# Patient Record
Sex: FEMALE | Race: WHITE | Employment: FULL TIME | URBAN - METROPOLITAN AREA
[De-identification: names, ages, dates, MRNs, and addresses within clinical notes are randomized per-mention and may not be internally consistent; named-entity substitution may affect disease eponyms.]

---

## 2022-06-10 ENCOUNTER — LAB ENCOUNTER (OUTPATIENT)
Dept: LAB | Age: 27
End: 2022-06-10
Attending: EMERGENCY MEDICINE
Payer: COMMERCIAL

## 2022-06-10 ENCOUNTER — OFFICE VISIT (OUTPATIENT)
Dept: FAMILY MEDICINE CLINIC | Facility: CLINIC | Age: 27
End: 2022-06-10
Payer: COMMERCIAL

## 2022-06-10 VITALS
DIASTOLIC BLOOD PRESSURE: 62 MMHG | HEART RATE: 53 BPM | OXYGEN SATURATION: 100 % | HEIGHT: 68 IN | RESPIRATION RATE: 14 BRPM | WEIGHT: 157 LBS | SYSTOLIC BLOOD PRESSURE: 112 MMHG | BODY MASS INDEX: 23.79 KG/M2

## 2022-06-10 DIAGNOSIS — Z23 NEED FOR TDAP VACCINATION: ICD-10-CM

## 2022-06-10 DIAGNOSIS — K58.2 IRRITABLE BOWEL SYNDROME WITH BOTH CONSTIPATION AND DIARRHEA: ICD-10-CM

## 2022-06-10 DIAGNOSIS — Z00.00 LABORATORY EXAMINATION ORDERED AS PART OF A ROUTINE GENERAL MEDICAL EXAMINATION: ICD-10-CM

## 2022-06-10 DIAGNOSIS — Z00.00 ENCOUNTER FOR ANNUAL PHYSICAL EXAMINATION EXCLUDING GYNECOLOGICAL EXAMINATION IN A PATIENT OLDER THAN 17 YEARS: Primary | ICD-10-CM

## 2022-06-10 DIAGNOSIS — F41.1 GENERALIZED ANXIETY DISORDER: ICD-10-CM

## 2022-06-10 LAB
ALBUMIN SERPL-MCNC: 4.1 G/DL (ref 3.4–5)
ALBUMIN/GLOB SERPL: 1.1 {RATIO} (ref 1–2)
ALP LIVER SERPL-CCNC: 64 U/L
ALT SERPL-CCNC: 24 U/L
ANION GAP SERPL CALC-SCNC: 5 MMOL/L (ref 0–18)
AST SERPL-CCNC: 9 U/L (ref 15–37)
BASOPHILS # BLD AUTO: 0.07 X10(3) UL (ref 0–0.2)
BASOPHILS NFR BLD AUTO: 1.3 %
BILIRUB SERPL-MCNC: 0.5 MG/DL (ref 0.1–2)
BUN BLD-MCNC: 10 MG/DL (ref 7–18)
CALCIUM BLD-MCNC: 9.4 MG/DL (ref 8.5–10.1)
CHLORIDE SERPL-SCNC: 107 MMOL/L (ref 98–112)
CHOLEST SERPL-MCNC: 163 MG/DL (ref ?–200)
CO2 SERPL-SCNC: 27 MMOL/L (ref 21–32)
CREAT BLD-MCNC: 0.77 MG/DL
EOSINOPHIL # BLD AUTO: 0.2 X10(3) UL (ref 0–0.7)
EOSINOPHIL NFR BLD AUTO: 3.8 %
ERYTHROCYTE [DISTWIDTH] IN BLOOD BY AUTOMATED COUNT: 11.7 %
FASTING PATIENT LIPID ANSWER: YES
FASTING STATUS PATIENT QL REPORTED: YES
GLOBULIN PLAS-MCNC: 3.7 G/DL (ref 2.8–4.4)
GLUCOSE BLD-MCNC: 87 MG/DL (ref 70–99)
HCT VFR BLD AUTO: 45 %
HDLC SERPL-MCNC: 88 MG/DL (ref 40–59)
HGB BLD-MCNC: 14.9 G/DL
IMM GRANULOCYTES # BLD AUTO: 0.01 X10(3) UL (ref 0–1)
IMM GRANULOCYTES NFR BLD: 0.2 %
LDLC SERPL CALC-MCNC: 67 MG/DL (ref ?–100)
LYMPHOCYTES # BLD AUTO: 1.94 X10(3) UL (ref 1–4)
LYMPHOCYTES NFR BLD AUTO: 37.2 %
MCH RBC QN AUTO: 30.8 PG (ref 26–34)
MCHC RBC AUTO-ENTMCNC: 33.1 G/DL (ref 31–37)
MCV RBC AUTO: 93.2 FL
MONOCYTES # BLD AUTO: 0.41 X10(3) UL (ref 0.1–1)
MONOCYTES NFR BLD AUTO: 7.9 %
NEUTROPHILS # BLD AUTO: 2.58 X10 (3) UL (ref 1.5–7.7)
NEUTROPHILS # BLD AUTO: 2.58 X10(3) UL (ref 1.5–7.7)
NEUTROPHILS NFR BLD AUTO: 49.6 %
NONHDLC SERPL-MCNC: 75 MG/DL (ref ?–130)
OSMOLALITY SERPL CALC.SUM OF ELEC: 286 MOSM/KG (ref 275–295)
PLATELET # BLD AUTO: 292 10(3)UL (ref 150–450)
POTASSIUM SERPL-SCNC: 4.4 MMOL/L (ref 3.5–5.1)
PROT SERPL-MCNC: 7.8 G/DL (ref 6.4–8.2)
RBC # BLD AUTO: 4.83 X10(6)UL
SODIUM SERPL-SCNC: 139 MMOL/L (ref 136–145)
TRIGL SERPL-MCNC: 32 MG/DL (ref 30–149)
TSI SER-ACNC: 1.18 MIU/ML (ref 0.36–3.74)
VLDLC SERPL CALC-MCNC: 5 MG/DL (ref 0–30)
WBC # BLD AUTO: 5.2 X10(3) UL (ref 4–11)

## 2022-06-10 PROCEDURE — 90471 IMMUNIZATION ADMIN: CPT | Performed by: EMERGENCY MEDICINE

## 2022-06-10 PROCEDURE — 80050 GENERAL HEALTH PANEL: CPT | Performed by: EMERGENCY MEDICINE

## 2022-06-10 PROCEDURE — 3008F BODY MASS INDEX DOCD: CPT | Performed by: EMERGENCY MEDICINE

## 2022-06-10 PROCEDURE — 99385 PREV VISIT NEW AGE 18-39: CPT | Performed by: EMERGENCY MEDICINE

## 2022-06-10 PROCEDURE — 80061 LIPID PANEL: CPT | Performed by: EMERGENCY MEDICINE

## 2022-06-10 PROCEDURE — 3078F DIAST BP <80 MM HG: CPT | Performed by: EMERGENCY MEDICINE

## 2022-06-10 PROCEDURE — 90715 TDAP VACCINE 7 YRS/> IM: CPT | Performed by: EMERGENCY MEDICINE

## 2022-06-10 PROCEDURE — 3074F SYST BP LT 130 MM HG: CPT | Performed by: EMERGENCY MEDICINE

## 2022-06-10 RX ORDER — DICYCLOMINE HYDROCHLORIDE 10 MG/1
10 CAPSULE ORAL 3 TIMES DAILY PRN
Qty: 30 CAPSULE | Refills: 3 | Status: SHIPPED | OUTPATIENT
Start: 2022-06-10 | End: 2022-06-20

## 2022-07-22 ENCOUNTER — OFFICE VISIT (OUTPATIENT)
Dept: OBGYN CLINIC | Facility: CLINIC | Age: 27
End: 2022-07-22
Payer: COMMERCIAL

## 2022-07-22 VITALS
BODY MASS INDEX: 23.81 KG/M2 | DIASTOLIC BLOOD PRESSURE: 76 MMHG | SYSTOLIC BLOOD PRESSURE: 128 MMHG | HEART RATE: 81 BPM | HEIGHT: 68 IN | WEIGHT: 157.13 LBS

## 2022-07-22 DIAGNOSIS — Z01.419 WELL WOMAN EXAM WITH ROUTINE GYNECOLOGICAL EXAM: Primary | ICD-10-CM

## 2022-07-22 DIAGNOSIS — Z12.4 SCREENING FOR CERVICAL CANCER: ICD-10-CM

## 2022-07-22 PROCEDURE — 88175 CYTOPATH C/V AUTO FLUID REDO: CPT | Performed by: OBSTETRICS & GYNECOLOGY

## 2022-07-22 PROCEDURE — 99385 PREV VISIT NEW AGE 18-39: CPT | Performed by: OBSTETRICS & GYNECOLOGY

## 2022-07-22 PROCEDURE — 87625 HPV TYPES 16 & 18 ONLY: CPT | Performed by: OBSTETRICS & GYNECOLOGY

## 2022-07-22 PROCEDURE — 3078F DIAST BP <80 MM HG: CPT | Performed by: OBSTETRICS & GYNECOLOGY

## 2022-07-22 PROCEDURE — 87624 HPV HI-RISK TYP POOLED RSLT: CPT | Performed by: OBSTETRICS & GYNECOLOGY

## 2022-07-22 PROCEDURE — 3008F BODY MASS INDEX DOCD: CPT | Performed by: OBSTETRICS & GYNECOLOGY

## 2022-07-22 PROCEDURE — 3074F SYST BP LT 130 MM HG: CPT | Performed by: OBSTETRICS & GYNECOLOGY

## 2022-08-01 ENCOUNTER — TELEPHONE (OUTPATIENT)
Dept: OBGYN CLINIC | Facility: CLINIC | Age: 27
End: 2022-08-01

## 2022-08-01 LAB
HPV I/H RISK 1 DNA SPEC QL NAA+PROBE: POSITIVE
LAST PAP RESULT: NORMAL
PAP HISTORY (OTHER THAN LAST PAP): NORMAL

## 2022-08-01 NOTE — TELEPHONE ENCOUNTER
Spoke to patient regarding results. Let her know that doctor has not reviewed the results yet. Once results are reviewed we will contact her with provider recommendations.

## 2022-08-02 LAB
HPV16 DNA CVX QL PROBE+SIG AMP: NEGATIVE
HPV18 DNA CVX QL PROBE+SIG AMP: NEGATIVE

## 2022-12-21 ENCOUNTER — OFFICE VISIT (OUTPATIENT)
Dept: OBGYN CLINIC | Facility: CLINIC | Age: 27
End: 2022-12-21
Payer: COMMERCIAL

## 2022-12-21 VITALS
HEART RATE: 86 BPM | HEIGHT: 68 IN | WEIGHT: 153.5 LBS | BODY MASS INDEX: 23.27 KG/M2 | SYSTOLIC BLOOD PRESSURE: 104 MMHG | DIASTOLIC BLOOD PRESSURE: 58 MMHG

## 2022-12-21 DIAGNOSIS — R87.610 ASCUS WITH POSITIVE HIGH RISK HPV CERVICAL: Primary | ICD-10-CM

## 2022-12-21 DIAGNOSIS — R87.810 ASCUS WITH POSITIVE HIGH RISK HPV CERVICAL: Primary | ICD-10-CM

## 2022-12-21 DIAGNOSIS — Z01.812 PRE-PROCEDURAL LABORATORY EXAMINATION: ICD-10-CM

## 2022-12-21 DIAGNOSIS — Z23 NEED FOR VACCINATION: ICD-10-CM

## 2022-12-21 LAB
CONTROL LINE PRESENT WITH A CLEAR BACKGROUND (YES/NO): YES YES/NO
PREGNANCY TEST, URINE: NEGATIVE

## 2022-12-21 PROCEDURE — 88342 IMHCHEM/IMCYTCHM 1ST ANTB: CPT | Performed by: OBSTETRICS & GYNECOLOGY

## 2022-12-21 PROCEDURE — 88305 TISSUE EXAM BY PATHOLOGIST: CPT | Performed by: OBSTETRICS & GYNECOLOGY

## 2022-12-21 NOTE — PROGRESS NOTES
Patient presents for scheduled colposcopy 2/2 ASCUS, +HPV but negative 16/18/45 noted on pap smear on 07/22/22. The patient with history of normal pap smears. Discussion held with the patient regarding pap smear findings and recommendations. Patient recommended colposcopy with cervical biopsies and ECC for further evaluation. The risks, benefits and alternatives were discussed with the patient. All questions and concerns were answered. The patient was agreeable with the recommendations. She provided written and verbal consent. Please refer to procedure note for further details. She has not had Gardisil vaccine. D/w patient including risks, benefits and alternatives. Patient provided with pamphlet. D/w patient HPV including prognosis, management and sexual transmission. Also d/w patient continue with tobacco use abstinence. Immunization History  Administered            Date(s) Administered    TDAP                  05/02/2007  06/10/2022          Pablo Corey MD   [facility] - OBGYN    Note to patient and family   The [facility] makes medical notes available to patients in the interest of transparency. However, please be advised that this is a medical document. It is intended as ieho-qu-ghbi communication. It is written and medical language may contain abbreviations or verbiage that are technical and unfamiliar. It may appear blunt or direct. Medical documents are intended to carry relevant information, facts as evident, and the clinical opinion of the practitioner. This note could include assistance by A[employer] recognition.  Errors in content may be related to improper recognition by the system; efforts to review and correct have been done but errors may still exist.

## 2022-12-21 NOTE — PATIENT INSTRUCTIONS
[facility] Department of OB/GYN  After Care Instructions for Colposcopy/Biopsy      Biopsy Results   You will receive a phone call with your biopsy results in 7 business days. If you have not received your biopsy results in 7 days, please contact our office. Your biopsy results will help the physician decide if and what further treatment is  necessary. Bleeding   After your biopsy, the area is swabbed with a brown solution to help stop any bleeding. For this reason, you may notice a brown or black clotty discharge lasting several days. If you experience bright red bleeding that is heavy, you should call the office. Restrictions    You should avoid douching, intercourse and tampon use for 3 days following your procedure. Pain    If you are uncomfortable after the procedure, you may use Aleve, Tylenol or Ibuprofen for the discomfort. If you have additional questions or concerns, please call us at [phone].

## 2022-12-26 NOTE — PROCEDURES
Colposcopy Procedure Note    Date of Procedure: 12/26/22    Indications: 32year old y/o female with ASCUS, +HPV but negative 16/18/4    Pre-procedure diagnosis:   ASCUS  +HPV but negative 16/18/4    Post-procedure diagnosis:  ASCUS  +HPV but negative 16/18/4    Procedure:  Colposcopy   Cervical Biopsy   ECC     Procedure Details:   A discussion was held with patient including diagnosis, prognosis and management. Recommendation made for colposcopy with possible biopsies. Risks, benefits and alteratives were discussed with the patient. The patient was agreed to proceed with procedure. She provided written and verbal consent. The patient was positioned supine and in stir-ups. The sterile speculum was placed in the vagina and the cervix was visualized. The vagina appeared normal with no lesions or discolorations noted. The cervix was then swabbed with sterile saline and no lesions seen under gross examination. Green light filter was negative. After application of acetic acid, white feathery acetowhite changes were noted at 12 o'clock. Please refer to image provided below. No mocaism, no punctations seen on gross examination. This findings were consistent after the application of Lugol's solution. A biopsy was then collected at each indicated lesion site. The transformation zone was  fully visualized. No lesions noted. satisfactory Colposcopy. ECC was collected. Biopsy sites were examined. Silver Nitrate and Monsel's solution was applied to the cervix. Good hemostasis noted. All instruments were removed from the vagina. All tissue were placed into the designated specimen containers and collected to be sent to pathology. Impression: HUSEYIN 1 pending final pathology     Disposition: Stable. RTC in 1 year for well woman exam or sooner if needed.       Boris Carmona MD   [facility] - OBGYN          Note to patient and family   The [facility] makes medical notes available to patients in the interest of transparency. However, please be advised that this is a medical document. It is intended as vvqc-jq-zzvc communication. It is written and medical language may contain abbreviations or verbiage that are technical and unfamiliar. It may appear blunt or direct. Medical documents are intended to carry relevant information, facts as evident, and the clinical opinion of the practitioner. This note could include assistance by [employer] recognition.  Errors in content may be related to improper recognition by the system; efforts to review and correct have been done but errors may still exist.

## 2023-05-15 NOTE — PATIENT INSTRUCTIONS
During pregnancy, here are some general recommendations:  Prenatal Vitamins: Pregnant women should consume the following each day through diet or supplements:  o Folic acid 016-502 micrograms   o Iron 30 mg (or be screened for anemia)  o Vitamin D 600 international units  o Calcium 1,000 mg  Avoid cigarette smoking, alcohol, drugs, and vaping  Avoid unpasteurized cheeses  Avoid raw or undercooked fish or meats  If you have deli meat, please microwave it or cook on the stove until steaming  Artificial Sweeteners: Artificial sweeteners can be used in pregnancy. Data regarding saccharin are conflicting. Low (typical) consumption is likely safe  Caffeine: Low-to-moderate caffeine intake in pregnancy does not appear to be associated with any adverse outcomes. Pregnant women may have caffeine but should probably limit it to less than 300 mg/d (a typical 8-ounce cup of brewed coffee has approximately 130 mg of caffeine. An 8-ounce cup of tea or 12-ounce soda has approximately 50 mg of caffeine), but exact amounts vary based on the specific beverage or food. Avoid hot tub use  Hair dye is presumed safe, but there are no randomized trials. Insect Repellants: Topical insect repellants (including DEET) can be used in pregnancy and should be used in areas with high risk for insect-borne illnesses. Women should avoid fish with high mercury content, including adam mackerel, shark, swordfish, marlin, and tilefish. The mercury content of commercial fish can be found at              https://[employer]/. 34              Another good resource can be found online at the [address] [place] [address]. Food and Drug Administration website                 [employer]. 12. Wash all fruits and veggies before eating  13. Oral health and routine dental procedures should continue as scheduled during pregnancy.  These include cleanings, extraction, scaling, root canal, radiographs (assuming the abdomen and thyroid are shielded), and restoration and fillings  14. Please check the [facility] website regarding the [place] virus if you are planning to travel to [facility] or [facility]  15. Genetic testing is available for chromosomal abnormalities in the first trimester. The goal timing of this is between 11-14 weeks. 12. Genetic testing is available for cystic fibrosis and spinal muscular atrophy. Recommended weight gain goals:      THE [facility]- Prenatal Testing     The following information is designed to help you understand some of the optional tests that are available to you to screen for problems in your pregnancy. Before considering any of these tests, you will need to consider the following questions:     [1] What would you do if an abnormality were detected? If the answer is nothing, then you may decide to decline all testing. [2] Would you be willing to undergo testing which might increase your risk of miscarriage, such as an amniocentesis or CVS (see below)? [3] If you have the initial testing, and it indicates that there might be a problem, and you subsequently decide not to do invasive testing such as an amniocentesis, would you worry excessively through the remainder of the pregnancy? The following tests are available to screen for problems in your pregnancy:     [1] First Trimester Screening (also called Nuchal Thickness, Nuchal Translucency or NT)- This is an abdominal ultrasound done between 10 and 14 weeks by a perinatology specialist (Maternal Fetal Medicine, MFM) which measures the thickness of the skin behind your baby’s neck. Increased thickness of the skin in this area has been linked to an increased risk of genetic abnormalities like Down Syndrome. A second visit may be required if the baby is not in the correct position.   The ultrasound results are combined with a finger stick blood test to increase the sensitivity of the test. You will need to schedule an appointment with the Maternal Fetal Medicine office. Address and phone number below:   Please verify insurance coverage:   CPT code: 58143 (ramírez) or 34963 (twins)   Diagnosis: Genetic Screening- Z36.8A   Maternal Fetal Medicine   Dr. Luis Carlos Stout, Dr. Ki Rosenberg, Dr. Megha Miller and Dr. Renu Acevedo   [address] [place] [address]   [place], [address]   Phone [phone]   Fax [phone]     [2]  Cell-Free DNA testing ([id number])- This is a blood test done any time after 10-11 weeks which screens for genetic abnormalities like Down Syndrome. It is greater than 98% sensitive, but requires an amniocentesis for confirmation if abnormal.  It can also tell you the sex of the baby. Please call [facility] at [phone] to verify insurance coverage:   CPT code: 22960   Diagnosis code: Genetic screening- Z36.8A     [3]  Quad Screen (also called AFP-plus or Tetra Screen)-  This is a simple blood test which screens for both genetic abnormalities like Down Syndrome and neural tube defects (NTDs), such as spina bifida (an abnormal opening in the spine which can cause paralysis). It is usually performed around 16-20 weeks. If the Quad screen comes back abnormal, it does not mean that your baby definitely has an abnormality. It only means that there is an increased risk of an abnormality. Additional testing such as a Level II Ultrasound or Amniocentesis may be recommended (see below). This test is less accurate at picking up genetic abnormalities than the cell-free DNA test.  If you have test #1 or 2, then usually only the AFP part of the Quad screen would be performed to screen for NTD’s (see below).    Please verify insurance coverage:   CPT code: 77509   Diagnosis code: Genetic screening- Z36.8A     [4] Alpha Fetoprotein (AFP, MSAFP)- This is a simple blood test that screens for neural tube defects such as spina bifida (an abnormal opening in the spine). It is usually performed around 16-20 weeks. Additional testing such as a Level II ultrasound may be recommended for an abnormal test result. Please verify insurance coverage:   CPT code: 99884   Diagnosis code: Genetic Screening- Z36.8A     [5]  Cystic Fibrosis/SMA Carrier Screening-  Cystic Fibrosis is a genetic disease which causes lung problems in the infant. SMA (spinal muscular atrophy) is a genetic disease that affects the nerve cells of the spinal cord. These genetic defects would need to be passed from both parents to the child. A blood test is performed on the mother, and if her test is positive, then the father should also be tested. These tests can be done at any time in the pregnancy, usually in the first trimester with your initial OB labs. All babies are screened for cystic fibrosis after birth. Please verify insurance coverage:   Cystic Fibrosis CPT code: 01376     Diagnosis code: Cystic Fibrosis screening- Z13.228   SMA CPT code: 86182  Diagnosis code: Genetic Screening- Z36.8A , or Testing for Genetic Disease Carrier- Z13.71     [6]  Level II Ultrasound-  This is an abdominal ultrasound done at approximately 20-21 weeks by a perinatology specialist (SARAH) at [facility] which looks at many of the baby’s internal structures. Abnormalities in certain structures have been associated with an increased risk of genetic abnormalities. It also screens for NTD’s. This ultrasound would replace the Level I Ultrasound that we normally perform at our office around the same time. [7]  Amniocentesis-  During this procedure, a needle is passed through your abdominal wall to withdrawal some amniotic fluid from around the baby. It screens for both genetic abnormalities and NTD’s, and is usually performed around 15-16 weeks. This test has the highest accuracy for detecting genetic abnormalities, but there may be a small risk of miscarriage or other complications.   A similar procedure called Chorionic Villus Sampling (CVS) is performed by passing a catheter through the vagina to remove a small sample of tissue from the placenta (afterbirth). CVS may have a higher risk of miscarriage and other rare complications compared to amniocentesis, but can be performed earlier in pregnancy. Amniocentesis is often recommended/offered if any of the previously mentioned tests come back abnormal.  A pamphlet is available if you would like more information about this option. If you decide to have an amniocentesis, the other tests would be unnecessary. The above information covers some of the basics. Pamphlets on the Cell-Free DNA test, Quad Screen and Cystic Fibrosis test should be in your prenatal packet. Your doctor will discuss this information in more detail, and feel free to ask additional questions. Also, remember that all of these tests are optional, and will only be performed at your request.  Testing that needs to be done through the perinatologist's office may require 2-3 weeks lead time to schedule.

## 2023-05-19 ENCOUNTER — ULTRASOUND ENCOUNTER (OUTPATIENT)
Dept: OBGYN CLINIC | Facility: CLINIC | Age: 28
End: 2023-05-19
Payer: COMMERCIAL

## 2023-05-19 ENCOUNTER — OFFICE VISIT (OUTPATIENT)
Dept: OBGYN CLINIC | Facility: CLINIC | Age: 28
End: 2023-05-19
Payer: COMMERCIAL

## 2023-05-19 VITALS
HEIGHT: 68 IN | SYSTOLIC BLOOD PRESSURE: 120 MMHG | BODY MASS INDEX: 23.85 KG/M2 | DIASTOLIC BLOOD PRESSURE: 60 MMHG | WEIGHT: 157.38 LBS

## 2023-05-19 DIAGNOSIS — N91.1 SECONDARY AMENORRHEA: Primary | ICD-10-CM

## 2023-05-19 PROCEDURE — 3078F DIAST BP <80 MM HG: CPT | Performed by: OBSTETRICS & GYNECOLOGY

## 2023-05-19 PROCEDURE — 99213 OFFICE O/P EST LOW 20 MIN: CPT | Performed by: OBSTETRICS & GYNECOLOGY

## 2023-05-19 PROCEDURE — 3074F SYST BP LT 130 MM HG: CPT | Performed by: OBSTETRICS & GYNECOLOGY

## 2023-05-19 PROCEDURE — 3008F BODY MASS INDEX DOCD: CPT | Performed by: OBSTETRICS & GYNECOLOGY

## 2023-06-23 ENCOUNTER — INITIAL PRENATAL (OUTPATIENT)
Dept: OBGYN CLINIC | Facility: CLINIC | Age: 28
End: 2023-06-23
Payer: COMMERCIAL

## 2023-06-23 VITALS
DIASTOLIC BLOOD PRESSURE: 71 MMHG | HEART RATE: 90 BPM | BODY MASS INDEX: 24.58 KG/M2 | WEIGHT: 162.19 LBS | SYSTOLIC BLOOD PRESSURE: 111 MMHG | HEIGHT: 68 IN

## 2023-06-23 DIAGNOSIS — Z36.9 ANTENATAL SCREENING ENCOUNTER: ICD-10-CM

## 2023-06-23 DIAGNOSIS — Z34.00 SUPERVISION OF NORMAL FIRST PREGNANCY, ANTEPARTUM: Primary | ICD-10-CM

## 2023-06-23 DIAGNOSIS — Z36.8A ENCOUNTER FOR ANTENATAL SCREENING FOR OTHER GENETIC DEFECT: ICD-10-CM

## 2023-06-23 DIAGNOSIS — Z11.3 SCREEN FOR STD (SEXUALLY TRANSMITTED DISEASE): ICD-10-CM

## 2023-06-23 PROBLEM — Z34.02 ENCOUNTER FOR SUPERVISION OF NORMAL FIRST PREGNANCY IN SECOND TRIMESTER: Status: ACTIVE | Noted: 2023-06-23

## 2023-06-23 LAB
GLUCOSE (URINE DIPSTICK): NEGATIVE MG/DL
MULTISTIX LOT#: NORMAL NUMERIC
PROTEIN (URINE DIPSTICK): NEGATIVE MG/DL

## 2023-06-23 PROCEDURE — 81002 URINALYSIS NONAUTO W/O SCOPE: CPT | Performed by: NURSE PRACTITIONER

## 2023-06-23 PROCEDURE — 3074F SYST BP LT 130 MM HG: CPT | Performed by: NURSE PRACTITIONER

## 2023-06-23 PROCEDURE — 87591 N.GONORRHOEAE DNA AMP PROB: CPT | Performed by: NURSE PRACTITIONER

## 2023-06-23 PROCEDURE — 3078F DIAST BP <80 MM HG: CPT | Performed by: NURSE PRACTITIONER

## 2023-06-23 PROCEDURE — 87086 URINE CULTURE/COLONY COUNT: CPT | Performed by: NURSE PRACTITIONER

## 2023-06-23 PROCEDURE — 87491 CHLMYD TRACH DNA AMP PROBE: CPT | Performed by: NURSE PRACTITIONER

## 2023-06-23 PROCEDURE — 3008F BODY MASS INDEX DOCD: CPT | Performed by: NURSE PRACTITIONER

## 2023-06-26 LAB
C TRACH DNA SPEC QL NAA+PROBE: NEGATIVE
N GONORRHOEA DNA SPEC QL NAA+PROBE: NEGATIVE

## 2023-06-28 ENCOUNTER — LAB ENCOUNTER (OUTPATIENT)
Dept: LAB | Age: 28
End: 2023-06-28
Attending: NURSE PRACTITIONER
Payer: COMMERCIAL

## 2023-06-28 DIAGNOSIS — Z36.8A ENCOUNTER FOR ANTENATAL SCREENING FOR OTHER GENETIC DEFECT: ICD-10-CM

## 2023-06-28 DIAGNOSIS — Z36.9 ANTENATAL SCREENING ENCOUNTER: ICD-10-CM

## 2023-06-28 LAB
ANTIBODY SCREEN: NEGATIVE
BASOPHILS # BLD AUTO: 0.05 X10(3) UL (ref 0–0.2)
BASOPHILS NFR BLD AUTO: 0.5 %
EOSINOPHIL # BLD AUTO: 0.26 X10(3) UL (ref 0–0.7)
EOSINOPHIL NFR BLD AUTO: 2.8 %
ERYTHROCYTE [DISTWIDTH] IN BLOOD BY AUTOMATED COUNT: 11.7 %
HBV SURFACE AG SER-ACNC: <0.1 [IU]/L
HBV SURFACE AG SERPL QL IA: NONREACTIVE
HCT VFR BLD AUTO: 41.2 %
HCV AB SERPL QL IA: NONREACTIVE
HGB BLD-MCNC: 14.4 G/DL
IMM GRANULOCYTES # BLD AUTO: 0.05 X10(3) UL (ref 0–1)
IMM GRANULOCYTES NFR BLD: 0.5 %
LYMPHOCYTES # BLD AUTO: 1.67 X10(3) UL (ref 1–4)
LYMPHOCYTES NFR BLD AUTO: 18.1 %
MCH RBC QN AUTO: 31.7 PG (ref 26–34)
MCHC RBC AUTO-ENTMCNC: 35 G/DL (ref 31–37)
MCV RBC AUTO: 90.7 FL
MONOCYTES # BLD AUTO: 0.57 X10(3) UL (ref 0.1–1)
MONOCYTES NFR BLD AUTO: 6.2 %
NEUTROPHILS # BLD AUTO: 6.61 X10 (3) UL (ref 1.5–7.7)
NEUTROPHILS # BLD AUTO: 6.61 X10(3) UL (ref 1.5–7.7)
NEUTROPHILS NFR BLD AUTO: 71.9 %
PLATELET # BLD AUTO: 280 10(3)UL (ref 150–450)
RBC # BLD AUTO: 4.54 X10(6)UL
RH BLOOD TYPE: POSITIVE
RUBV IGG SER QL: POSITIVE
RUBV IGG SER-ACNC: 435.7 IU/ML (ref 10–?)
T PALLIDUM AB SER QL IA: NONREACTIVE
WBC # BLD AUTO: 9.2 X10(3) UL (ref 4–11)

## 2023-06-28 PROCEDURE — 86803 HEPATITIS C AB TEST: CPT

## 2023-06-28 PROCEDURE — 36415 COLL VENOUS BLD VENIPUNCTURE: CPT

## 2023-06-28 PROCEDURE — 86762 RUBELLA ANTIBODY: CPT

## 2023-06-28 PROCEDURE — 87340 HEPATITIS B SURFACE AG IA: CPT

## 2023-06-28 PROCEDURE — 86850 RBC ANTIBODY SCREEN: CPT

## 2023-06-28 PROCEDURE — 87389 HIV-1 AG W/HIV-1&-2 AB AG IA: CPT

## 2023-06-28 PROCEDURE — 86901 BLOOD TYPING SEROLOGIC RH(D): CPT

## 2023-06-28 PROCEDURE — 86780 TREPONEMA PALLIDUM: CPT

## 2023-06-28 PROCEDURE — 85025 COMPLETE CBC W/AUTO DIFF WBC: CPT

## 2023-06-28 PROCEDURE — 86900 BLOOD TYPING SEROLOGIC ABO: CPT

## 2023-07-04 LAB
11Q23 DELETION (JACOBSEN): NOT DETECTED
15Q11 DELETION (PW ANGELMAN): NOT DETECTED
1P36 DELETION SYNDROME: NOT DETECTED
22Q11 DELETION (DIGEORGE): NOT DETECTED
4P16 DELETION(WOLF-HIRSCHHORN): NOT DETECTED
5P15 DELETION (CRI-DU-CHAT): NOT DETECTED
8Q24 DELETION (LANGER-GIEDION): NOT DETECTED
GESTATIONAL AGE > OR = 9W:: YES
MONOSOMY X (TURNER SYNDROME): NOT DETECTED
TEST RESULT: NEGATIVE
TRISOMY 13 (PATAU SYNDROME): NEGATIVE
TRISOMY 16: NOT DETECTED
TRISOMY 18 (EDWARDS SYNDROME): NEGATIVE
TRISOMY 21 (DOWN SYNDROME): NEGATIVE
TRISOMY 22: NOT DETECTED
XXX (TRIPLE X SYNDROME): NOT DETECTED
XXY (KLINEFELTER SYNDROME): NOT DETECTED
XYY (JACOBS SYNDROME): NOT DETECTED

## 2023-07-20 ENCOUNTER — ROUTINE PRENATAL (OUTPATIENT)
Dept: OBGYN CLINIC | Facility: CLINIC | Age: 28
End: 2023-07-20
Payer: COMMERCIAL

## 2023-07-20 VITALS
DIASTOLIC BLOOD PRESSURE: 60 MMHG | SYSTOLIC BLOOD PRESSURE: 120 MMHG | HEIGHT: 68 IN | WEIGHT: 169.63 LBS | BODY MASS INDEX: 25.71 KG/M2

## 2023-07-20 DIAGNOSIS — Z34.00 SUPERVISION OF NORMAL FIRST PREGNANCY, ANTEPARTUM: Primary | ICD-10-CM

## 2023-07-20 DIAGNOSIS — Z34.02 ENCOUNTER FOR SUPERVISION OF NORMAL FIRST PREGNANCY IN SECOND TRIMESTER: ICD-10-CM

## 2023-07-20 PROCEDURE — 81002 URINALYSIS NONAUTO W/O SCOPE: CPT | Performed by: NURSE PRACTITIONER

## 2023-07-20 PROCEDURE — 3074F SYST BP LT 130 MM HG: CPT | Performed by: NURSE PRACTITIONER

## 2023-07-20 PROCEDURE — 3078F DIAST BP <80 MM HG: CPT | Performed by: NURSE PRACTITIONER

## 2023-07-20 PROCEDURE — 3008F BODY MASS INDEX DOCD: CPT | Performed by: NURSE PRACTITIONER

## 2023-07-20 NOTE — PROGRESS NOTES
HENRY CHRISTINE noted intermittently  Doing well, no immediate concerns  No complaints.  No LOF/VB/uctx  Genetic testing- MaterniT 21 completed, will call if desires the AFP- she is aware of time limitations  Anatomy Scan 20 weeks

## 2023-08-02 PROBLEM — N87.0 DYSPLASIA OF CERVIX, LOW GRADE (CIN 1): Status: ACTIVE | Noted: 2022-12-21

## 2023-08-03 ENCOUNTER — ULTRASOUND ENCOUNTER (OUTPATIENT)
Dept: OBGYN CLINIC | Facility: CLINIC | Age: 28
End: 2023-08-03
Payer: COMMERCIAL

## 2023-08-03 ENCOUNTER — ROUTINE PRENATAL (OUTPATIENT)
Dept: OBGYN CLINIC | Facility: CLINIC | Age: 28
End: 2023-08-03
Payer: COMMERCIAL

## 2023-08-03 VITALS
DIASTOLIC BLOOD PRESSURE: 72 MMHG | WEIGHT: 172.5 LBS | HEART RATE: 78 BPM | BODY MASS INDEX: 26.14 KG/M2 | SYSTOLIC BLOOD PRESSURE: 122 MMHG | HEIGHT: 68 IN

## 2023-08-03 DIAGNOSIS — Z36.9 ANTENATAL SCREENING ENCOUNTER: ICD-10-CM

## 2023-08-03 DIAGNOSIS — Z36.89 SCREENING, ANTENATAL, FOR FETAL ANATOMIC SURVEY: ICD-10-CM

## 2023-08-03 DIAGNOSIS — Z3A.19 19 WEEKS GESTATION OF PREGNANCY: Primary | ICD-10-CM

## 2023-08-03 PROCEDURE — 76805 OB US >/= 14 WKS SNGL FETUS: CPT | Performed by: OBSTETRICS & GYNECOLOGY

## 2023-08-03 PROCEDURE — 3074F SYST BP LT 130 MM HG: CPT | Performed by: OBSTETRICS & GYNECOLOGY

## 2023-08-03 PROCEDURE — 3008F BODY MASS INDEX DOCD: CPT | Performed by: OBSTETRICS & GYNECOLOGY

## 2023-08-03 PROCEDURE — 81002 URINALYSIS NONAUTO W/O SCOPE: CPT | Performed by: OBSTETRICS & GYNECOLOGY

## 2023-08-03 PROCEDURE — 3078F DIAST BP <80 MM HG: CPT | Performed by: OBSTETRICS & GYNECOLOGY

## 2023-08-03 NOTE — PROGRESS NOTES
Patient presents with:  Prenatal Care: HENRY after U/S    Routine prenatal visit. Patient without complaints. Good fetal movement. Patient denies any bleeding, leaking fluid, cramping, or contractions. Assessment/Plan:  19w5d doing well  1 hr GTT and CBC next. Blood type O pos. Screening for fetal malformations- Structure survey done today, normal.  Patient had [place] [employer]. Declines carrier, level 2 and AFP. Diagnoses and all orders for this visit:    19 weeks gestation of pregnancy  -     URINALYSIS NONAUTO W/O SCOPE (OB URISTIX)  -     CBC (WITH DIFF, PLATELET) REFLEX TO FERRITIN; Future  -     GLUCOSE 1HR OB; Future    Screening, , for fetal anatomic survey  -     US OB COMPLETE 2ND TRIMESTER >14 WKS [facility] ONLY [id number]; Future     screening encounter    Other orders  -     OB/GYNE ULTRASOUND SCAN  -     OB/GYNE ULTRASOUND REPORT        Return in about 4 weeks (around 2023) for Routine Prenatal Visit, Glucola and labs.

## 2023-09-01 ENCOUNTER — ROUTINE PRENATAL (OUTPATIENT)
Dept: OBGYN CLINIC | Facility: CLINIC | Age: 28
End: 2023-09-01
Payer: COMMERCIAL

## 2023-09-01 VITALS
HEIGHT: 68 IN | DIASTOLIC BLOOD PRESSURE: 70 MMHG | BODY MASS INDEX: 27.34 KG/M2 | SYSTOLIC BLOOD PRESSURE: 114 MMHG | WEIGHT: 180.38 LBS | HEART RATE: 75 BPM

## 2023-09-01 DIAGNOSIS — Z34.90 PRENATAL CARE, ANTEPARTUM: Primary | ICD-10-CM

## 2023-09-01 PROCEDURE — 3078F DIAST BP <80 MM HG: CPT | Performed by: OBSTETRICS & GYNECOLOGY

## 2023-09-01 PROCEDURE — 3008F BODY MASS INDEX DOCD: CPT | Performed by: OBSTETRICS & GYNECOLOGY

## 2023-09-01 PROCEDURE — 3074F SYST BP LT 130 MM HG: CPT | Performed by: OBSTETRICS & GYNECOLOGY

## 2023-09-15 ENCOUNTER — LAB ENCOUNTER (OUTPATIENT)
Dept: LAB | Age: 28
End: 2023-09-15
Attending: OBSTETRICS & GYNECOLOGY
Payer: COMMERCIAL

## 2023-09-15 DIAGNOSIS — Z3A.19 19 WEEKS GESTATION OF PREGNANCY: ICD-10-CM

## 2023-09-15 LAB
BASOPHILS # BLD AUTO: 0.03 X10(3) UL (ref 0–0.2)
BASOPHILS NFR BLD AUTO: 0.3 %
EOSINOPHIL # BLD AUTO: 0.21 X10(3) UL (ref 0–0.7)
EOSINOPHIL NFR BLD AUTO: 1.9 %
ERYTHROCYTE [DISTWIDTH] IN BLOOD BY AUTOMATED COUNT: 12.1 %
GLUCOSE 1H P GLC SERPL-MCNC: 130 MG/DL
HCT VFR BLD AUTO: 34.1 %
HGB BLD-MCNC: 12.1 G/DL
IMM GRANULOCYTES # BLD AUTO: 0.07 X10(3) UL (ref 0–1)
IMM GRANULOCYTES NFR BLD: 0.6 %
LYMPHOCYTES # BLD AUTO: 1.69 X10(3) UL (ref 1–4)
LYMPHOCYTES NFR BLD AUTO: 15.4 %
MCH RBC QN AUTO: 31.8 PG (ref 26–34)
MCHC RBC AUTO-ENTMCNC: 35.5 G/DL (ref 31–37)
MCV RBC AUTO: 89.7 FL
MONOCYTES # BLD AUTO: 0.58 X10(3) UL (ref 0.1–1)
MONOCYTES NFR BLD AUTO: 5.3 %
NEUTROPHILS # BLD AUTO: 8.39 X10 (3) UL (ref 1.5–7.7)
NEUTROPHILS # BLD AUTO: 8.39 X10(3) UL (ref 1.5–7.7)
NEUTROPHILS NFR BLD AUTO: 76.5 %
PLATELET # BLD AUTO: 258 10(3)UL (ref 150–450)
RBC # BLD AUTO: 3.8 X10(6)UL
WBC # BLD AUTO: 11 X10(3) UL (ref 4–11)

## 2023-09-15 PROCEDURE — 85025 COMPLETE CBC W/AUTO DIFF WBC: CPT | Performed by: OBSTETRICS & GYNECOLOGY

## 2023-09-15 PROCEDURE — 82950 GLUCOSE TEST: CPT | Performed by: OBSTETRICS & GYNECOLOGY

## 2023-09-29 ENCOUNTER — ROUTINE PRENATAL (OUTPATIENT)
Dept: OBGYN CLINIC | Facility: CLINIC | Age: 28
End: 2023-09-29
Payer: COMMERCIAL

## 2023-09-29 VITALS
HEIGHT: 68 IN | SYSTOLIC BLOOD PRESSURE: 102 MMHG | WEIGHT: 185.25 LBS | HEART RATE: 71 BPM | BODY MASS INDEX: 28.08 KG/M2 | DIASTOLIC BLOOD PRESSURE: 66 MMHG

## 2023-09-29 DIAGNOSIS — Z3A.28 28 WEEKS GESTATION OF PREGNANCY: Primary | ICD-10-CM

## 2023-09-29 PROCEDURE — 3008F BODY MASS INDEX DOCD: CPT | Performed by: STUDENT IN AN ORGANIZED HEALTH CARE EDUCATION/TRAINING PROGRAM

## 2023-09-29 PROCEDURE — 3074F SYST BP LT 130 MM HG: CPT | Performed by: STUDENT IN AN ORGANIZED HEALTH CARE EDUCATION/TRAINING PROGRAM

## 2023-09-29 PROCEDURE — 3078F DIAST BP <80 MM HG: CPT | Performed by: STUDENT IN AN ORGANIZED HEALTH CARE EDUCATION/TRAINING PROGRAM

## 2023-09-29 NOTE — PROGRESS NOTES
Return OB Visit 20-27 WGA      GA: 27w6d   23  1311   BP: 102/66   Pulse: 71   Weight: 185 lb 4 oz (84 kg)   Height: 68\"       Doing well. Denies LOF/VB/uctx. +FM. RH positive  S/P Anatomy scan   1 hr glucose and CBC (24-28wks) normal    Patient Active Problem List    Encounter for supervision of normal first pregnancy in second trimester      Dysplasia of cervix, low grade (HUSEYIN 1)            2022. Plan repeat Pap/HPV one year      Irritable bowel syndrome with both constipation and diarrhea      Generalized anxiety disorder           RTC q2 weeks        Note to patient and family   The [facility] makes medical notes available to patients in the interest of transparency. However, please be advised that this is a medical document. It is intended as yuzv-xd-stqp communication. It is written and medical language may contain abbreviations or verbiage that are technical and unfamiliar. It may appear blunt or direct. Medical documents are intended to carry relevant information, facts as evident, and the clinical opinion of the practitioner.     Humble Kay MD

## 2023-10-09 ENCOUNTER — LABORATORY ENCOUNTER (OUTPATIENT)
Dept: LAB | Age: 28
End: 2023-10-09
Attending: STUDENT IN AN ORGANIZED HEALTH CARE EDUCATION/TRAINING PROGRAM
Payer: COMMERCIAL

## 2023-10-09 DIAGNOSIS — Z3A.28 28 WEEKS GESTATION OF PREGNANCY: ICD-10-CM

## 2023-10-09 PROCEDURE — 87389 HIV-1 AG W/HIV-1&-2 AB AG IA: CPT | Performed by: STUDENT IN AN ORGANIZED HEALTH CARE EDUCATION/TRAINING PROGRAM

## 2023-10-10 ENCOUNTER — TELEPHONE (OUTPATIENT)
Dept: OBGYN CLINIC | Facility: CLINIC | Age: 28
End: 2023-10-10

## 2023-10-10 NOTE — TELEPHONE ENCOUNTER
Received orders from [place]. [facility] signed and I faxed back.  Copy in plfd

## 2023-10-13 ENCOUNTER — ROUTINE PRENATAL (OUTPATIENT)
Dept: OBGYN CLINIC | Facility: CLINIC | Age: 28
End: 2023-10-13
Payer: COMMERCIAL

## 2023-10-13 VITALS
BODY MASS INDEX: 28.67 KG/M2 | SYSTOLIC BLOOD PRESSURE: 110 MMHG | WEIGHT: 189.19 LBS | DIASTOLIC BLOOD PRESSURE: 60 MMHG | HEIGHT: 68 IN

## 2023-10-13 DIAGNOSIS — Z3A.29 29 WEEKS GESTATION OF PREGNANCY: Primary | ICD-10-CM

## 2023-10-13 PROCEDURE — 3074F SYST BP LT 130 MM HG: CPT | Performed by: NURSE PRACTITIONER

## 2023-10-13 PROCEDURE — 3078F DIAST BP <80 MM HG: CPT | Performed by: NURSE PRACTITIONER

## 2023-10-13 PROCEDURE — 3008F BODY MASS INDEX DOCD: CPT | Performed by: NURSE PRACTITIONER

## 2023-10-13 NOTE — PROGRESS NOTES
HENRY 29w6d    Doing well, +FM  Denies contractions  Denies LOF, VB      FHT-P  PNL:  HIV negative  Mode of delivery: anticipate    Immunizations: declines TDAP and influenza   labor precautions reviewed  Fetal movement discussed    Return in 2 weeks

## 2023-10-26 NOTE — PROGRESS NOTES
Return OB Visit 28-33 WGA      GA: 31w5d   10/26/23  1122   BP: 112/78   Pulse: 74   Weight: 189 lb 6 oz (85.9 kg)       Doing well, +FM  Denies LOF/VB/uctx  Rh positive, TDAP declined today, EPDS Depression Scale Total: 0 (2023  1:12 PM)   PTL and Fetal movement instructions given  3rd T HIV NR      Patient Active Problem List    Encounter for supervision of normal first pregnancy in second trimester      Dysplasia of cervix, low grade (HUSEYIN 1)            2022. Plan repeat Pap/HPV one year      Irritable bowel syndrome with both constipation and diarrhea      Generalized anxiety disorder          RTC in 2 wks      Note to patient and family   The [facility] makes medical notes available to patients in the interest of transparency. However, please be advised that this is a medical document. It is intended as cffr-it-cwdh communication. It is written and medical language may contain abbreviations or verbiage that are technical and unfamiliar. It may appear blunt or direct. Medical documents are intended to carry relevant information, facts as evident, and the clinical opinion of the practitioner.       Deon Mcdonald MD

## 2023-11-10 NOTE — PROGRESS NOTES
HENRY 33w6d    Doing well, +FM  Denies contractions  Denies LOF, VB      FHT-P  PNL:  HIV negative  Anticipatory guidance given on SVE and GBS  Mode of delivery: anticipate   Immunizations: Declines TDAP and influenza   labor precautions reviewed  Fetal movement discussed    Return in 2 weeks

## 2023-11-20 NOTE — PATIENT INSTRUCTIONS
[address] has significant outbreaks of pertussis (whooping cough) every year. Therefore, the CDC recommends that all pregnant women receive a Tdap vaccine during pregnancy, regardless of whether you have received one previously. The vaccine reduces your chances of taniya the illness, and also provides some natural immunity to your baby for possible exposures after birth. The best time to get the vaccine is between 27 and 36 weeks. Baby caregivers (grandparents, spouse) should also make sure they are up to date on the vaccine (within the last 10 years). Influenza- Pregnant women who develop the flu are more prone to serious complications that can affect both mother and baby. The CDC recommends that all women who will be pregnant during flu season (October to May) receive the flu vaccine (injection only, not nasal spray). The vaccine can be given during any stage of pregnancy. Both vaccines are offered in our office, as well as through many pharmacies and primary care offices. Covid-19 Vaccine   If you are pregnant or were recently pregnant, you are more likely to get very sick from COVID-19 than people who are not pregnant. Additionally, if you have COVID-19 during pregnancy, you are more likely to have complications that can affect your pregnancy and developing baby. Please check the CDC website for the most up-to-date recommendations on Covid vaccination at www.cdc.gov/coronavirus/vaccine    COVID-19 vaccination is recommended for everyone ages 7 months and older, including people who are pregnant, breastfeeding, trying to get pregnant now, or who might become pregnant in the future. Evidence shows that COVID-19 vaccination before and during pregnancy is safe and effective and suggests that the benefits of vaccination outweigh any known or potential risks.  New data show that vaccination during pregnancy can help protect babies younger than 7 months old, when they are too young to be vaccinated themselves, from hospitalization due to COVID-19. RSV Vaccine  RSV (Respiratory Syncytial Virus) is a common respiratory virus that causes cold like symptoms in adults and babies. Infants and adults over 61years old are more likely to develop severe RSV infection requiring hospitalization. A new RSV vaccine was released in October 2023 that if given during the third trimester of pregnancy, reduces your baby's risk of being hospitalized with RSV after birth by up to 80%. Therefore the CDC recommends that pregnant moms receive one dose of the RSV vaccine Pfizer Abrysvo sometime between 28 and 36 weeks of pregnancy, before or during RSV season (September through January). If you decide not to get the vaccine, you can talk to your pediatrician about your baby receiving the RSV antibody injection Beyfortus after birth.

## 2023-11-20 NOTE — PROGRESS NOTES
35w2d seen for routine OB visit. Denies ctx, lof, vb. Reports good FM. Patient Active Problem List   Diagnosis    Irritable bowel syndrome with both constipation and diarrhea    Generalized anxiety disorder    Dysplasia of cervix, low grade (HUSEYIN 1)    Encounter for supervision of normal first pregnancy in second trimester        TW lb 6 oz (20.1 kg)   Depression Scale Total: 0 (2023  1:12 PM)      Gen: AAOx3, NAD  Resp: breathing comfortably  Abdomen: gravid, soft, nontender  Ext: nontender, no edema    Plan:  - 3T HIV neg  - previously declined flu and tdap vaccines, counseled regarding RSV as well. Will consider tdap and/or RSV for next visit. - GBS next visit  - return precautions reviewed    RTO in 1 week(s).

## 2023-11-27 NOTE — PROGRESS NOTES
Chief Complaint   Patient presents with    Prenatal Care     HENRY     Routine prenatal visit. Patient without complaints. Good fetal movement. Patient denies any bleeding, leaking fluid, cramping, or regular uterine contractions. SVE:  //3 vertex  Assessment/Plan:  36w2d doing well  GBBS done  Kick counts reviewed. Reviewed labor signs and symptoms. Diagnoses and all orders for this visit:    Prenatal care, antepartum     screening encounter  -     Group B Strep PCR; Future      Return in about 1 week (around 2023) for Routine Prenatal Visit.

## 2023-12-06 NOTE — PROGRESS NOTES
RETURN OB 35-41 WGA      GA: 37w4d  Vitals:    23 0753   BP: 116/68   Pulse: 105   Weight: 198 lb 8 oz (90 kg)   Height: 68\"       Doing well, +FM  Denies LOF/VB/uctx  Mode of delivery:   anticipated  SVE deferred   GBS neg  Fetal movement count given  Labor precautions provided     Patient Active Problem List    Diagnosis    Encounter for supervision of normal first pregnancy in second trimester    Dysplasia of cervix, low grade (HUSEYIN 1)     2022. Plan repeat Pap/HPV one year      Irritable bowel syndrome with both constipation and diarrhea    Generalized anxiety disorder           RTC 1 week      Note to patient and family   The [facility] makes medical notes available to patients in the interest of transparency. However, please be advised that this is a medical document. It is intended as kxtg-fy-lopd communication. It is written and medical language may contain abbreviations or verbiage that are technical and unfamiliar. It may appear blunt or direct. Medical documents are intended to carry relevant information, facts as evident, and the clinical opinion of the practitioner.     Radha Catherine MD

## 2023-12-10 NOTE — PROGRESS NOTES
Pt is a  at 45 1/7 wk iup who is brought to room triage-2 for r/o ROM. Pt reports feeling at gush of fluid and continued LOF since approx 0830 this am. Pt reports +fm and denies any VB or UC's. During SSE, a copious amount of green tinged fluid noted in vault and on speculum. EFM tested and applied. POC dicussed and questions answered.

## 2023-12-10 NOTE — PLAN OF CARE
Problem: BIRTH - VAGINAL/ SECTION  Goal: Fetal and maternal status remain reassuring during the birth process  Description: INTERVENTIONS:  - Monitor vital signs  - Monitor fetal heart rate  - Monitor uterine activity  - Monitor labor progression (vaginal delivery)  - DVT prophylaxis (C/S delivery)  - Surgical antibiotic prophylaxis (C/S delivery)  Outcome: Progressing     Problem: PAIN - ADULT  Goal: Verbalizes/displays adequate comfort level or patient's stated pain goal  Description: INTERVENTIONS:  - Encourage pt to monitor pain and request assistance  - Assess pain using appropriate pain scale  - Administer analgesics based on type and severity of pain and evaluate response  - Implement non-pharmacological measures as appropriate and evaluate response  - Consider cultural and social influences on pain and pain management  - Manage/alleviate anxiety  - Utilize distraction and/or relaxation techniques  - Monitor for opioid side effects  - Notify MD/LIP if interventions unsuccessful or patient reports new pain  - Anticipate increased pain with activity and pre-medicate as appropriate  Outcome: Progressing     Problem: ANXIETY  Goal: Will report anxiety at manageable levels  Description: INTERVENTIONS:  - Administer medication as ordered  - Teach and rehearse alternative coping skills  - Provide emotional support with 1:1 interaction with staff  Outcome: Progressing     Problem: Patient/Family Goals  Goal: Patient/Family Long Term Goal  Description: Patient's Long Term Goal: adequate pain management     Interventions:  -   - See additional Care Plan goals for specific interventions  Outcome: Progressing  Goal: Patient/Family Short Term Goal  Description: Patient's Short Term Goal:  progress toward     Interventions:   -   - See additional Care Plan goals for specific interventions  Outcome: Progressing

## 2023-12-11 NOTE — ANESTHESIA PROCEDURE NOTES
Labor Analgesia    Date/Time: 12/10/2023 6:20 PM    Performed by: Parviz Donaldson DO  Authorized by: Parviz Donaldson DO      General Information and Staff    Start Time:  12/10/2023 6:20 PM  End Time:  12/10/2023 6:22 PM  Anesthesiologist:  Parviz Donaldson DO  Performed by:   Anesthesiologist  Patient Location:  OB  Site Identification: surface landmarks    Reason for Block: labor epidural    Preanesthetic Checklist: patient identified, IV checked, risks and benefits discussed, monitors and equipment checked, pre-op evaluation, timeout performed, IV bolus, anesthesia consent and sterile technique used      Procedure Details    Patient Position:  Sitting  Prep: ChloraPrep    Monitoring:  Heart rate and continuous pulse ox  Approach:  Midline    Epidural Needle    Injection Technique:  HEYDI air  Needle Type:  Tuohy  Needle Gauge:  17 G  Needle Length:  3.375 in  Needle Insertion Depth:  5  Location:  L3-4    Spinal Needle      Catheter    Catheter Type:  End hole  Catheter Size:  19 G  Test Dose:  Negative    Assessment    Sensory Level:  T10    Additional Comments

## 2023-12-11 NOTE — PLAN OF CARE
Problem: BIRTH - VAGINAL/ SECTION  Goal: Fetal and maternal status remain reassuring during the birth process  Description: INTERVENTIONS:  - Monitor vital signs  - Monitor fetal heart rate  - Monitor uterine activity  - Monitor labor progression (vaginal delivery)  - DVT prophylaxis (C/S delivery)  - Surgical antibiotic prophylaxis (C/S delivery)  2023 by Pete Richards RN  Outcome: Completed  12/10/2023 1936 by Pete Richards RN  Outcome: Progressing     Problem: PAIN - ADULT  Goal: Verbalizes/displays adequate comfort level or patient's stated pain goal  Description: INTERVENTIONS:  - Encourage pt to monitor pain and request assistance  - Assess pain using appropriate pain scale  - Administer analgesics based on type and severity of pain and evaluate response  - Implement non-pharmacological measures as appropriate and evaluate response  - Consider cultural and social influences on pain and pain management  - Manage/alleviate anxiety  - Utilize distraction and/or relaxation techniques  - Monitor for opioid side effects  - Notify MD/LIP if interventions unsuccessful or patient reports new pain  - Anticipate increased pain with activity and pre-medicate as appropriate  2023 by Pete Richards RN  Outcome: Completed  12/10/2023 1936 by Pete Richards RN  Outcome: Progressing     Problem: ANXIETY  Goal: Will report anxiety at manageable levels  Description: INTERVENTIONS:  - Administer medication as ordered  - Teach and rehearse alternative coping skills  - Provide emotional support with 1:1 interaction with staff  2023 by Pete Richards RN  Outcome: Completed  12/10/2023 1936 by Pete Richards RN  Outcome: Progressing     Problem: Patient/Family Goals  Goal: Patient/Family Long Term Goal  Description: Patient's Long Term Goal: Safe and uncomplicated delivery    Interventions:  - See additional Care Plan goals for specific interventions  2023 0149 by Gabriel Justice RN  Outcome: Completed  12/10/2023 1936 by Gabriel Justice RN  Outcome: Progressing  Goal: Patient/Family Short Term Goal  Description: Patient's Short Term Goal: Adequate pain control    Interventions:   - See additional Care Plan goals for specific interventions  12/11/2023 0149 by Gabriel Justice RN  Outcome: Completed  12/10/2023 1936 by Gabriel Justice RN  Outcome: Progressing

## 2023-12-11 NOTE — PROGRESS NOTES
Patient up to bathroom with assist x 2. Patient straight cathed 650 ml. Patient transferred to mother/baby room 1113 per wheelchair in stable condition with baby and personal belongings. Accompanied by significant other and staff. Report given to mother/baby RN.

## 2023-12-11 NOTE — PLAN OF CARE
Problem: BIRTH - VAGINAL/ SECTION  Goal: Fetal and maternal status remain reassuring during the birth process  Description: INTERVENTIONS:  - Monitor vital signs  - Monitor fetal heart rate  - Monitor uterine activity  - Monitor labor progression (vaginal delivery)  - DVT prophylaxis (C/S delivery)  - Surgical antibiotic prophylaxis (C/S delivery)  Outcome: Progressing     Problem: PAIN - ADULT  Goal: Verbalizes/displays adequate comfort level or patient's stated pain goal  Description: INTERVENTIONS:  - Encourage pt to monitor pain and request assistance  - Assess pain using appropriate pain scale  - Administer analgesics based on type and severity of pain and evaluate response  - Implement non-pharmacological measures as appropriate and evaluate response  - Consider cultural and social influences on pain and pain management  - Manage/alleviate anxiety  - Utilize distraction and/or relaxation techniques  - Monitor for opioid side effects  - Notify MD/LIP if interventions unsuccessful or patient reports new pain  - Anticipate increased pain with activity and pre-medicate as appropriate  Outcome: Progressing     Problem: ANXIETY  Goal: Will report anxiety at manageable levels  Description: INTERVENTIONS:  - Administer medication as ordered  - Teach and rehearse alternative coping skills  - Provide emotional support with 1:1 interaction with staff  Outcome: Progressing     Problem: SAFETY ADULT - FALL  Goal: Free from fall injury  Description: INTERVENTIONS:  - Assess pt frequently for physical needs  - Identify cognitive and physical deficits and behaviors that affect risk of falls.   - [place] fall precautions as indicated by assessment.  - Educate pt/family on patient safety including physical limitations  - Instruct pt to call for assistance with activity based on assessment  - Modify environment to reduce risk of injury  - Provide assistive devices as appropriate  - Consider OT/PT consult to assist with strengthening/mobility  - Encourage toileting schedule  Outcome: Progressing     Problem: Patient/Family Goals  Goal: Patient/Family Long Term Goal  Description: Patient's Long Term Goal: Safe and uncomplicated delivery    Interventions:  - See additional Care Plan goals for specific interventions  Outcome: Progressing  Goal: Patient/Family Short Term Goal  Description: Patient's Short Term Goal: Adequate pain control    Interventions:   - See additional Care Plan goals for specific interventions  Outcome: Progressing

## 2023-12-11 NOTE — PROGRESS NOTES
Patient admitted via wheelchair. Bed in low position and locked. Side rails up x2. IV saline locked. ID bands checked with transfer RN. Call light demonstrated and given to Pt. Oriented to room and plan of care. Pt. Instructed to call for staff assist to go to the bathroom. Pt. Augusto Márquez understanding.

## 2023-12-11 NOTE — L&D DELIVERY NOTE
Juan Ramon Marshall, Girl [[id number]]      Labor Events     labor?: No   steroids?: None  Antibiotics received during labor?: No  Rupture date/time: 12/10/2023 0830     Rupture type: SROM  Fluid color: Yellow, Meconium  Labor type: Spontaneous Onset of Labor  Augmentation: Oxytocin  Indications for augmentation: Ineffective Contraction Pattern  Intrapartum & labor complications: Meconium, Variable decelerations       Labor Event Times    Labor onset date/time: 12/10/2023 1440  Dilation complete date/time: 12/10/2023 230  Start pushing date/time: 12/10/2023 232        Presentation    Presentation: Vertex  Position: Occiput Anterior       Operative Delivery    Operative Vaginal Delivery: No                      Shoulder Dystocia    Shoulder Dystocia: No             Anesthesia    Method: Epidural               Delivery      Head delivery date/time: 2023 01:08:20   Delivery date/time:  23 01:09:10   Delivery type: Normal spontaneous vaginal delivery    Details:     Delivery location: delivery room       Delivery Providers    Delivering Clinician: Serge Hernandez DO   Delivery personnel:  Provider Role   Keith Parada RN Baby Nurse   Benjamín Driscoll, RN Delivery Nurse   Geovanny Hameed, RN Baby Nurse             Cord    Vessels: 3 Vessels  Complications: None  Timed cord clamping: Yes  Time in sec: 30  Cord blood disposition: to lab  Gases sent?: No       [place] Measurements      Weight: 3870 g 8 lb 8.5 oz Length: 54.6 cm     Head circum.: 37.5 cm Chest circum.: 34.5 cm          Abdominal circum. : 29 cm           Placenta    Date/time: 2023 0111  Removal: Spontaneous  Appearance: Intact  Disposition: held for future pathology       Apgars    Living status: Living   Apgar Scoring Key:    0 1 2    Skin color Blue or pale Acrocyanotic Completely pink    Heart rate Absent <100 bpm >100 bpm    Reflex irritability No response Grimace Cry or active withdrawal    Muscle tone Limp Some flexion Active motion    Respiratory effort Absent Weak cry; hypoventilation Good, crying              1 Minute:  5 Minute:  10 Minute:  15 Minute:  20 Minute:      Skin color:        Heart rate:        Reflex irritablity:        Muscle tone:        Respiratory effort: Total:            disposition: with mother       Skin to Skin    Skin to skin with: Mother       Vaginal Count    Initial count RN: Dany Joe RN  Initial count Tech: Adan Pacer   Sponges   Sharps    Initial counts 11   0    Final counts               Delivery (Maternal)    Episiotomy: Median  Perineal lacerations: None      Periurethral laceration: bilateral Repaired?: Yes     Vaginal laceration?: No      Cervical laceration?: No    Clitoral laceration?: No                25 y.o  at 36w4d with  female infant, APGARs 8/9, weight 8lb9ox. Body and shoulders easily delivered. Bulb suctioned. Cord clamped x2 and cut after 30 seconds. Placenta delivered spontaneously, intact. Midline episiotomy and b/l ykung urethral lacerations repaired with 2-0 and 3-0 chromic.   cc

## 2023-12-12 NOTE — DISCHARGE SUMMARY
Areli Busby Patient Status:  inpatient    1995 MRN [id number]   Location 1113/1113-A Attending [facility] [address] PCP She Vasquez MD     VAGINAL DELIVERY DISCHARGE SUMMARY     Admit date: 12/10/2023    Discharge date: 2023     Attending Physician: Fernando Puga MD     Discharge Diagnoses: Term pregnancy, premature ruptured membranes    Procedures: Induction of labor, normal spontaneous vaginal delivery with repair of median episiotomy    Complications: None    Hospital Course: Routine postpartum care    Discharged Condition: Stable    Disposition: Home     Current Discharge Medication List        START taking these medications    Details   ibuprofen 600 MG Oral Tab Take 1 tablet (600 mg total) by mouth every 6 (six) hours as needed for Pain. Qty: 20 tablet, Refills: 0           CONTINUE these medications which have NOT CHANGED    Details   acidophilus-pectin Oral Cap Take 1 capsule by mouth daily. Prenatal Vit-DSS-Fe Cbn-FA (PRENATAL AD OR) Take by mouth.              Diet: General    Activity: Light activity, pelvic rest,  no driving for 1 week    Follow-up:   [id number] [phone]  Follow up in 6 week(s)  for post partum visit

## 2023-12-14 NOTE — PROGRESS NOTES
OBGYN Post Partum apt request (delivered 12/11)    Dr Ulises Thacker  [facility]  [address]  [phone]  Follow up 6 weeks  LVM to call [phone] to assist w/scheduling apt

## 2023-12-15 NOTE — PROGRESS NOTES
OBGYN Post Partum apt request (delivered 12/11)     Dr Ulises Thacker  [facility]  [address]  [phone]  Follow up 6 weeks  Apt made:   Mon 01/22/24 @2:15pm w/FERMIN Kaufman [place]  Confirmed w/pt  Closing encounter

## 2024-01-08 NOTE — TELEPHONE ENCOUNTER
Dr. Lara,      *The ACKNOWLEDGE button has been moved to the top right ribbon*    Please sign off on form if you agree to: [facility] [place] 12/11/2023-03/03/2024  (place your signature on the first page only)    -From your Inbasket, Highlight the patient and click Chart   -Double click the 12/12/2023 Forms Completion telephone encounter  -Scroll down to the Media section   -Click the blue Hyperlink: FMLA Maternity Dr. Anish Lara 01/08/2024  -Click Acknowledge located in the top right ribbon/menu   -Drag the mouse into the blank space of the document and a + sign will appear. Left click to   electronically sign the document.     Thank you,  Bharti FITCH

## 2024-01-09 NOTE — TELEPHONE ENCOUNTER
FMLA forms faxed to Rose Gautam, Ayo Alessandra @ [phone], sent MyChart to adv pt. Archived forms.

## 2024-01-22 NOTE — PROGRESS NOTES
[facility]  Obstetrics and Gynecology   Postpartum Progress Note    Subjective:     Aditi Busby is a 28 year old  female who is s/p  on 12/10/2023. Her pregnancy was complicated by anxiety. She reports doing well. Baby is doing well and breast feeding. The patient reports vagina bleeding has stopped. The patient denies emotional concerns.     Review of Systems:  General: denies fevers, chills, fatigue and malaise.   Respiratory: denies SOB, dyspnea, cough or wheezing  Cardiovascular: denies chest pain, palpitations, exercise intolerance   GI:denies abdominal pain, diarrhea, constipation  : denies dysuria, hematuria, increased urinary frequency. denies abnormal uterine bleeding or vaginal discharge.       Objective:     Vitals:    24 1419   BP: 110/70   Weight: 172 lb (78 kg)   Height: 67\"         Body mass index is 26.94 kg/m².    General: AAO.NAD.   CVS exam: normal peripheral perfusion  Chest: non-labored breathing, no tachypnea   Abdominal exam: soft, nontender, nondistended  Pelvic exam:   VULVA: normal appearing vulva with no masses, tenderness or lesions  PERINEUM: intact, well healed, no signs of infection.   VAGINA: normal appearing vagina with normal color and discharge, no lesions  CERVIX: normal appearing cervix without discharge or lesions  UTERUS: uterus is normal size, shape, consistency and nontender  ADNEXA: normal adnexa in size, nontender and no masses  Ext: non-tender, no edema    Labs:         Assessment:     Aditi Busby is a 28 year old  female who presents for postpartum visit   Patient Active Problem List   Diagnosis    Irritable bowel syndrome with both constipation and diarrhea    Generalized anxiety disorder    Dysplasia of cervix, low grade (HUSEYIN 1)    Encounter for supervision of normal first pregnancy in second trimester    Pregnancy    Leakage, amniotic fluid    Meconium in amniotic fluid     (normal spontaneous vaginal delivery)          Plan:     Postpartum exam   - s/p  on 12/10/2023  - doing well, no complaints   - no abnormal findings on physical exam   - may return to normal activity   Contraception counseling   - discussion held with patient about family planning and contraception  - pt reports they will use condoms  History HUSEYIN-1  Thin Prep with HPV sent    All of the findings and plan were discussed with the patient.  She notes understanding and agrees with the plan of care.  All questions were answered to the best of my ability at this time.    RTC in 6-12 months for well woman exam or sooner if needed     FERMIN Kim  [facility] - OBGYN           Note to patient and family   The 21st Century Cures Act makes medical notes available to patients in the interest of transparency.  However, please be advised that this is a medical document.  It is intended as [id number]-[id number] communication.  It is written and medical language may contain abbreviations or verbiage that are technical and unfamiliar.  It may appear blunt or direct.  Medical documents are intended to carry relevant information, facts as evident, and the clinical opinion of the practitioner.

## 2024-12-30 NOTE — TELEPHONE ENCOUNTER
Patient calling to initiate prenatal care  LMP 11/18  Patient is 7-8 weeks on 1/13  Confirmation of pregnancy appointment scheduled on   Future Appointments   Date Time Provider Department Center   2/5/2025 12:15 PM [facility] [address]   2/5/2025  1:00 PM Connie Larry MD [facility] [address]       Insurance [employer]    Any history of ectopic pregnancy? no  Any history of miscarriage? no  Any medications that you are taking on a regular basis other than prenatal vitamins? No (if not taking prenatal vitamins, encourage patient to start taking.)  Any bleeding since the first day of last LMP and your positive pregnancy test? no

## 2024-12-30 NOTE — TELEPHONE ENCOUNTER
US order pended for Dr. Larry to sign.  Pt. has appointment. for US/ with Dr. Larry 2/5/25.    Pt. Is established in our practice.    Will send pt. A MCM with prenatal information and R precautions.

## 2025-02-05 NOTE — PROGRESS NOTES
[facility]  Obstetrics and Gynecology  History & Physical    CC: Patient is here to establish prenatal care     Subjective:     HPI:  Aditi Busby is a 29 year old  female at 11w2d who presents today to establish prenatal care. Denies vaginal bleeding, abdominal/pelvic pain, nausea and vomiting. Had some fatigue and nausea but getting better now   No problems with previous pregnancy or postpartum.  Had some postpartum anxiety.     She is taking an OTC PNV with DHA.    LMP: Patient's last menstrual period was 2024.  RENETTA:  Estimated Date of Delivery: 25 by     OB:  OB History    Para Term  AB Living   2 1 1 0 0 1   SAB IAB Ectopic Multiple Live Births   0 0 0 0 1      # Outcome Date GA Lbr Abhijeet/2nd Weight Sex Type Anes PTL Lv   2 Current            1 Term 23 38w2d 08:29 / 02:00 8 lb 8.5 oz (3.87 kg) F NORMAL SPONT EPI N MARTIN      Complications: Meconium, Variable decelerations      Obstetric Comments   23 - PROM. Meconium. Induction of labor, normal spontaneous vaginal delivery with repair of median episiotomy         GYN:   Menarche: 11-12  Period Cycle (Days): pregnant  Period Duration (Days): n/a  Period Flow: n/a  Use of Birth Control (if yes, specify type): None  Hx Prior Abnormal Pap: Yes (2024)  Pap Date: 24  Pap Result Notes: ASCUS - tx bx.    STD hx- none  The patient states she had chicken pox (varicella) or varicella vaccine .     PMH:   Past Medical History:    Generalized anxiety disorder    Irritable bowel syndrome with both constipation and diarrhea       PSH:  History reviewed. No pertinent surgical history.    MEDS:  Medications Ordered Prior to Encounter[1]    Allergies:    Allergies[2]       Immunizations:  Immunization History   Administered Date(s) Administered    TDAP 2007, 06/10/2022   Pended Date(s) Pended    TDAP 2023       Family Hx:      Family History   Problem Relation Age of Onset    Heart Disorder Maternal  Grandfather     Cancer Maternal Grandfather         Colon cancer    Thyroid disease Paternal Grandmother     Heart Attack Paternal Grandfather        SocialHx:        Social History     Socioeconomic History    Marital status:    Tobacco Use    Smoking status: Never    Smokeless tobacco: Never   Vaping Use    Vaping status: Never Used   Substance and Sexual Activity    Alcohol use: Not Currently    Drug use: Never    Sexual activity: Not Currently     Partners: Male   Other Topics Concern    Caffeine Concern Yes    Exercise Yes    Seat Belt Yes     Social Drivers of Health     Food Insecurity: No Food Insecurity (1/29/2025)    Food Insecurity     Food Insecurity: Never true   Transportation Needs: No Transportation Needs (1/29/2025)    Transportation Needs     Lack of Transportation: No   Stress: No Stress Concern Present (1/29/2025)    Stress     Feeling of Stress : No   Housing Stability: Low Risk  (1/29/2025)    Housing Stability     Housing Instability: No         - She reports living with  and daughter.    - Denied cigarette, alcohol, and drug use.           Review of Systems:  General: no complaints per category. See HPI for additional information.   Breast: no complaints per category. See HPI for additional information.   Respiratory: no complaints per category. See HPI for additional information.   Cardiovascular: no complaints per category. See HPI for additional information.   GI: no complaints per category. See HPI for additional information.   : no complaints per category. See HPI for additional information.   Heme: no complaints per category. See HPI for additional information.     Objective:     PE:  Vitals: /64   Pulse 72   Ht 67\"   Wt 160 lb 12.8 oz (72.9 kg)   LMP 11/18/2024   BMI 25.18 kg/m²   Gen: A/O, NAD  Head/Neck: neck supple, thyroid non-enlarged, symmetric and without nodules  Breasts: breasts symmetric, no dominant or suspicious mass, no skin or nipple changes and  no axillary adenopathy  CV: normal peripheral perfusion  Lungs: no tachypnea, non-labored breathing  Abdominal exam: soft, nontender, nondistended  Ext: non-tender, no edema  :  1) External Genitalia - Normal appearing skin and hair distribution, no visible lesions.   2) Vagina: mucosa appears pink, and well rugated. Suprapubic arch within normal limits, ischial spines not prominent.   3) Cervix: smooth, with no visible lesions, ectropion noted, or scars, os closed. No bleeding noted.     Fetal Well Being Assessment:     BPM    Summary of Ultrasound Findings:  Ultrasound scan performed transabdominally.  LMP 2024  70y0bpt by LMP;  08e8khz by ultrasound  Viable intrauterine pregnancy  Dates consistent with ultrasound.  Final EDC:  2025 by   LMP consistent with ultrasound on 2025  Normal ovaries bilaterally  Connie Larry MD  [facility]- Obstetrics & Gynecology    Note to patient and family:  The  Cares Act makes medical reports and notes available to patients in the interest of transparency.  However, please be advised that this is a medical document.  It is intended as [id number]-jm-[id number] communication.  It is written in medical language and may contain abbreviations or verbiage that are technical and unfamiliar    Assessment/Plan:     Aditi Busby is a 29 year old  female at 11w2d who presents today for initial prenatal visit.     Patient Active Problem List    Diagnosis    Supervision of other normal pregnancy, antepartum (HCC)    Dysplasia of cervix, low grade (HUSEYIN 1)     2022.  Plan repeat Pap/HPV one year      Irritable bowel syndrome with both constipation and diarrhea    Generalized anxiety disorder       Prenatal care  - dating by LMP c/w 11w US  - Order for prenatal labs.  Patient consents to HIV testing, counseled and cleared for release of HIV test results to patient.  - Pap: last 24 ASCUS, + HPV; hx of colpo; repeat pap collected and  ordered   - Cervical cultures: G/C collected and ordered   - continue PNV daily given      Genetic Screening tests  - Discussion held with patient about genetic screening. Pt desires cfDNA  - Cystic fibrosis/SMA/hemoglobinopathy carrier screening was done in last pregnancy and negative     Patient education  - Pt counseled on safety, diet, exercise, caffiene, tobacco, ETOH, sexual activity, ER precautions, diet, exercise, appropriate weight gain, travel, appropriate otc medication use, substance abuse and pets.  - Counseled on taking a PNV with 0.4mg of folic acid   - Limiting intake of alcohol, coffee, tea, soda, and other foods containing caffeine  - Limit intake of fish to twice weekly to limit mercury exposure  - COVID 19 and Flu precautions provided    - Pregnancy Weight Gain was discussed:  BMI: 23 pregravid so ideally the patient should gain   BMI 18.5-24.9: 25-30 lbs (0.8-1 lb/wk in 2nd and 3rd tri)      Patient is not a candidate for aspirin.    Patient is not a candidate for an early glucola screen.  Return to clinic in 4 wks for HENRY Larry MD   [facility] - OBGYN    Discussed with patient that there will not be further notification of normal or benign results other than receiving results on MobiTV. A MobiTV message or telephone call will be placed by the physician and/or office staff if results are abnormal.     Note to patient and family   The 21st Century Cures Act makes medical notes available to patients in the interest of transparency.  However, please be advised that this is a medical document.  It is intended as udei-er-vxzq communication.  It is written and medical language may contain abbreviations or verbiage that are technical and unfamiliar.  It may appear blunt or direct.  Medical documents are intended to carry relevant information, facts as evident, and the clinical opinion of the practitioner.                   [1]   Current Outpatient Medications on File Prior to Visit   Medication  Sig Dispense Refill    acidophilus-pectin Oral Cap Take 1 capsule by mouth daily.      Prenatal Vit-DSS-Fe Cbn-FA (PRENATAL AD OR) Take by mouth.      ibuprofen 600 MG Oral Tab Take 1 tablet (600 mg total) by mouth every 6 (six) hours as needed for Pain. 20 tablet 0     No current facility-administered medications on file prior to visit.   [2] No Known Allergies

## 2025-02-10 NOTE — TELEPHONE ENCOUNTER
Pt is requesting an order to be placed for genetic testing but is not sure which one she would like. Pt is requesting a call from a nurse for more clarification. Please advise, thank you.

## 2025-02-11 NOTE — TELEPHONE ENCOUNTER
Pt calling back again for genetic testing order . She callied yesterday aswell and is upset no one contacted her  . And also she needs to speak with to speak with nurse regarding order for negrito testing her ins does require pre auth can be contacted via  select notification option when you call

## 2025-02-11 NOTE — TELEPHONE ENCOUNTER
Estimated Date of Delivery: 8/25/25 - currently 12w1d   Blood type: O+  Patient desires NIPT testing- would like gender reported.  Order placed for Panorama.  Patient aware that Paty will send her a link by text or email to schedule mobile phlebotomy and ship a kit to her home.     Order Number: [id number]    Notifed patient that Paty will be checking her insurance, completing any pre-authorizations and contacting her directly if out-of-pocket discount is cheaper than using her insurance.  Patient verbalized understanding.

## 2025-02-11 NOTE — TELEPHONE ENCOUNTER
Kenrick from [facility] called on behalf of patient for same issue and states that insurance needs prior Authorization from the doctor in order for patient  to have the genetic testing. He can be reached at [phone]. Thank you

## 2025-02-26 NOTE — TELEPHONE ENCOUNTER
Panorama result populated from [facility] and copied into Epic order.  Provider to review in their in-basket.

## 2025-02-26 NOTE — TELEPHONE ENCOUNTER
Renee results received via fax in [place] location , placed in nurses bin here in [place]

## 2025-03-05 NOTE — PROGRESS NOTES
HENRY  FM- none yet  Doing well, feeling pretty good now  No complaints. No LOF/VB/uctx  Genetic testing completed, declines the AFP  Anatomy Scan with HENRY at 20 weeks

## 2025-04-09 NOTE — PROGRESS NOTES
Return OB 20 WGA      GA: 20w2d  Vitals:    25 1517   BP: 118/54   Weight: 172 lb (78 kg)   Height: 67\"       Doing well  No complaints.  Denies LOF/VB/uctx  RH positive  Genetic testing normal NIPT   Anatomy Scan completed today  CBC and 1 hr GTT order and instructions provided    Patient Active Problem List    Diagnosis    Supervision of other normal pregnancy, antepartum (HCC)    Dysplasia of cervix, low grade (HUSEYIN 1)     2022.  Plan repeat Pap/HPV one year  2024 ASCUS + HPV  2025 repeat pap collected;discussed gardasil postpartum       Irritable bowel syndrome with both constipation and diarrhea    Generalized anxiety disorder          RTC in 4 wks         Note to patient and family   The 21st Century Cures Act makes medical notes available to patients in the interest of transparency.  However, please be advised that this is a medical document.  It is intended as srik-hg-swdq communication.  It is written and medical language may contain abbreviations or verbiage that are technical and unfamiliar.  It may appear blunt or direct.  Medical documents are intended to carry relevant information, facts as evident, and the clinical opinion of the practitioner.    Cholo Venegas MD

## 2025-05-07 NOTE — PATIENT INSTRUCTIONS
Gestational Diabetes Screening Instructions     This is a routine test done during pregnancy during the 24-28th week of gestation. The purpose of this test is to determine if your body is able to process sugar and to see if you have developed gestational diabetes.   Please follow the instructions below very carefully so you will not have to repeat the test.     1.  ____x__ One Hour Glucose Test--Do not eat anything two hours before   your scheduled test. Plan to arrive at the office at least 15 minutes before   your scheduled appointment. Please notify the  that you are   having the sugar test done. This test may not be scheduled in the last   hour of our business.       ______ Three Hour Glucose Test--You may not have food 8 hours   before  your scheduled test. Drinking water is allowed during these 8   hours. Plan to  arrive at the office at least 15 minutes before your    scheduled appointment. Please notify the  that you are   having the sugar test done. To make this easiest on yourself, this    test should be scheduled during the morning hours of our business.     2. You will be given a chilled glucola drink. You must drink the entire    contents of the bottle within 5 minutes. The time you finish the drink must   be marked down.     3. After starting the test, you will not be allowed to leave the office or have   anything to eat or drink. This includes all foods, beverages, candy, mints,   gum, etc.     4.  Your blood will be drawn in the office lab, exactly sixty minutes after you   have finished the glucola drink.     5. Your provider/staff nurse will notify you of your results when they come   back.      Medications Safe in Pregnancy  The following over-the-counter medications may be taken safely after 12 weeks gestation by any patient who is pregnant.  Please follow the instructions on the package for adult dosage.  If you experience any symptoms prior to 12 weeks, please call the  office at [phone].      Colds/Congestion: Flonase, Saline Nasal Spray, Neti Pot, Plain Robitussin, Robitussin DM, Mucinex DM, Vicks 44 E, Vicks Vapor rub, Cough drops without Zinc or Sudafed.   Contact your doctor if you have a persistent fever over 100.4, shortness of breath, coughing up green mucus, or a sore throat that persists from more than 3 days    Diarrhea: Imodium, Maalox Anti-Diarrheal or Kaopectate  Contact the office if you have diarrhea for more than 3 days.    Allergies: Benadryl, Claritin or Zyrtec    Hemorrhoids: Tucks pads, Preparation H, Annusol, Sitz bath or Witch hazel  Eat a high fiber diet and drink plenty of fluids.    Yeast: Monistat 3 or 7  Call if your symptoms do not improve, or if you experience vaginal bleeding, or a watery discharge.    Constipation: Metamucil, Colace, fiber supplement, Milk of Magnesia or Dulcolax  Drink plenty of fluids, eat high-fiber foods and take the above laxatives sporadically.     Headache or Mild aches and pain: Extra Strength Tylenol     Gas: Gas X, Gelusil, Papaya Tablets, Maalox, Mylicon or Mylanta Gas    Heartburn: Tums, Mylanta, Pepcid AC or Maalox    Sore throat: Alcohol free Chloraseptic spray or Lozenges     Nausea and Vomiting: ½ tablet Unisom plus 100mg of Vitamin B6 at bedtime (may increase B6 up to 200mg per day), Ni root tea or raspberry leaf tea    Insomnia: Tylenol PM, Vitamin B6 50mg, warm bath or milk, Unisom, Nytol or Sominex.     We have listed a few medications for common symptoms seen in pregnancy.  Please contact the office if you are unsure about any over the counter medications that are not listed above.

## 2025-05-07 NOTE — PROGRESS NOTES
HENRY    GA: 24w2d  Vitals:    25 1258   BP: 112/62   Pulse: 84   Weight: 175 lb 3.2 oz (79.5 kg)   Height: 67\"       Doing well. Denies LOF/VB/uctx. +FM.   RH +  S/P Anatomy scan 04/15 and   1 hr glucose and CBC (24-28wks) - reminded to complete         Assessment:     Aditi Busby is a 30 year old  at 24w2d who presents for routine OB visit. Overall doing well.     Problem List Items Addressed This Visit          Gravid and     Supervision of other normal pregnancy, antepartum (HCC) - Primary           Plan:     Patient Active Problem List    Diagnosis    Supervision of other normal pregnancy, antepartum (HCC)    Dysplasia of cervix, low grade (HUSEYIN 1)     2022.  Plan repeat Pap/HPV one year  2024 ASCUS + HPV  2025 repeat pap collected;discussed gardasil postpartum       Irritable bowel syndrome with both constipation and diarrhea    Generalized anxiety disorder       - continue routine prenatal care   - labor and rupture of membrane precautions provided  -Fetal movement instructions given    Return to clinic in 4 weeks for HENRY visit           Note to patient and family   The 21st Century Cures Act makes medical notes available to patients in the interest of transparency.  However, please be advised that this is a medical document.  It is intended as elga-vw-dvfx communication.  It is written and medical language may contain abbreviations or verbiage that are technical and unfamiliar.  It may appear blunt or direct.  Medical documents are intended to carry relevant information, facts as evident, and the clinical opinion of the practitioner.      Tammy Rossi MD   [facility] - OBGYN

## 2025-06-04 NOTE — PATIENT INSTRUCTIONS
KICK COUNT INSTRUCTIONS    After 28 weeks of pregnancy, we would like for you to monitor your baby’s movement daily by doing kick counts, an easy way for you to assess your baby’s well-being.    How to count kicks:  Choose a time when the baby is active, such as after a meal.  Sit comfortably or lie on your side, and count the number of movements in an hour… you should feel 10 movements in 2 hours    The evening hours seem to be the most convenient time to do this test, although you can also do this test anytime you are concerned about the baby’s movement.    Call the office right away at [phone] if you notice any of the following:  Your baby moves less than 10 times in 2 hours while you are doing kick counts.  Your baby moves much less often than on the days before.  You have not felt your baby move all day.

## 2025-06-04 NOTE — PROGRESS NOTES
Return OB Visit 28-33 WGA      GA: 28w2d  Vitals:    25 1310   BP: 112/64   Pulse: 91   Weight: 181 lb (82.1 kg)   Height: 67\"       Doing well, +FM  Denies LOF/VB/uctx  Rh positive, TDAP declined,EPDS Depression Scale Total: 0 (2025 12:56 PM)   PTL and Fetal movement instructions given  3rd T HIV/RPR ordered      Patient Active Problem List    Diagnosis    History of episiotomy     Aditi reports she was not consented and was unaware it even occurred. She would understandably like it avoided in future labors.      Tetanus, diphtheria, and acellular pertussis (Tdap) vaccination declined    Supervision of other normal pregnancy, antepartum (HCC)    Dysplasia of cervix, low grade (HUSEYIN 1)     2022.  Plan repeat Pap/HPV one year  2024 ASCUS + HPV  2025 NILM HPV neg       Irritable bowel syndrome with both constipation and diarrhea    Generalized anxiety disorder         RTC in 2 wks      Note to patient and family   The  Century Cures Act makes medical notes available to patients in the interest of transparency.  However, please be advised that this is a medical document.  It is intended as gplx-mt-coax communication.  It is written and medical language may contain abbreviations or verbiage that are technical and unfamiliar.  It may appear blunt or direct.  Medical documents are intended to carry relevant information, facts as evident, and the clinical opinion of the practitioner.      Cholo Venegas MD

## 2025-06-18 NOTE — PROGRESS NOTES
HENRY    GA: 30w2d  Vitals:    25 0834   BP: 112/74   Pulse: 102   Weight: 183 lb (83 kg)   Height: 67\"       Doing well, +FM  Denies LOF/VB/uctx  Rh positive, TDAP declined, Depression Scale Total: 0 (2025 12:56 PM)   Reminded pt to complete 3T labs  PTL and kick count instructions given    Assessment:     Aditi Busby is a 30 year old  at 30w2d who presents for routine OB visit. Overall doing well.     Problem List Items Addressed This Visit       Irritable bowel syndrome with both constipation and diarrhea    Generalized anxiety disorder    Dysplasia of cervix, low grade (HUSEYIN 1)    Supervision of other normal pregnancy, antepartum (HCC) - Primary    History of episiotomy    Tetanus, diphtheria, and acellular pertussis (Tdap) vaccination declined           Plan:     Patient Active Problem List    Diagnosis    History of episiotomy     Aditi reports she was not consented and was unaware it even occurred. She would understandably like it avoided in future labors.      Tetanus, diphtheria, and acellular pertussis (Tdap) vaccination declined    Supervision of other normal pregnancy, antepartum (HCC)    Dysplasia of cervix, low grade (HUSEYIN 1)     2022.  Plan repeat Pap/HPV one year  2024 ASCUS + HPV  2025 NILM HPV neg       Irritable bowel syndrome with both constipation and diarrhea    Generalized anxiety disorder       - continue routine prenatal care   - labor and rupture of membrane precautions provided  - Fetal movement instructions given    Return to clinic in 2 weeks for HENRY visit     Elza Mercedes, DO  [facility] - OBGYN    Note to patient and family   The 21st Century Cures Act makes medical notes available to patients in the interest of transparency.  However, please be advised that this is a medical document.  It is intended as ttqw-wz-wxbu communication.  It is written and medical language may contain abbreviations or verbiage that are technical and unfamiliar.  It may appear  blunt or direct.  Medical documents are intended to carry relevant information, facts as evident, and the clinical opinion of the practitioner.

## 2025-06-18 NOTE — TELEPHONE ENCOUNTER
Pt states FMLA form's were turned in two weeks ago and were suppose to be turned in yesterday. Pt is inquiring on forms and when they will be ready. Please advise, thank you.

## 2025-06-18 NOTE — TELEPHONE ENCOUNTER
Dr. Venegas      Please sign off on form if you agree to:   Family Medical Leave Act continuous   Maternity Leave    (place your signature on the first page only)    -From your Inbasket, Highlight the patient and click Chart   -Double click the 6/4/25 Forms Completion telephone encounter  -Scroll down to the Media section   -Click the blue Hyperlink: WES VENEGAS 6/18/25   -Click Acknowledge located in the top right ribbon/menu   -Drag the mouse into the blank space of the document and a + sign will appear. Left click to   electronically sign the document.     Thank you,   Zahida  Forms Dept

## 2025-06-18 NOTE — TELEPHONE ENCOUNTER
Type of Leave: Family Medical Leave Act  Reason for Leave: Maternity  Start date of leave: 8/25/25 RENETTA  End date of leave:  11/17/25 (12 WEEKS)

## 2025-06-18 NOTE — TELEPHONE ENCOUNTER
Family Medical Leave Act forms received via email, per patient request upload via [employer], logged for processing.

## 2025-06-18 NOTE — PATIENT INSTRUCTIONS
Labor Instructions    How do I know if it’s true labor?  One of the most important aspects of any pregnancy is being able to recognize the onset of labor.  Unfortunately, on occasion it can be difficult or confusing, especially if you have had one or more children.  Each labor can begin in a different way even if you have had four or five children.    If this is your first child, it is very common to have labor for an average greater than 10 hours; however, there have been rare instances for labor to be two hours or less for a first time mother. It is more important for you to know if this is your second or third baby to realize that any labor after the first is usually shorter.  There is no way to tell how long or short the labor will be. Therefore, please call us if you are unsure labor has started.       Usually, during the last six weeks of pregnancy, [place] contractions or “false labor pains occur”.  False labor is generally not very painful though it is not always easy to tell.  You may feel contractions, cramps or uterine tightening somewhere between every 3-30 minutes but they will not continue to get stronger over time.  If you lie down, drink plenty of fluid or walk around, the contractions may go away.   False contractions are very common if you have been active on your feet for several hours.   Women frequently worry about being sent home from the hospital without having their baby (i.e. the labor stopped).  Actually, this is an unnecessary worry, for this is an infrequent occurrence.     True labor usually begins in one of two ways.  In most patients it begins with contractions of the uterus, which are irregular (but not always) in the beginning.  They are cramp-like in character and feel similar to menstrual cramps.  After a while, they become more regular, and they seem to last a little longer, and feel a little sharper.  These symptoms are very important-more important- than the timing of the  contractions.  Having regular (usually closer), longer lasting (35-70 seconds), and sharper (more painful) contractions are the common symptoms of actual labor.  The second way in which labor can begin which occurs in approximately 30% of all patients is the rupture of the bag of water.  This is a sudden gush of watery fluid, usually sufficient to run down your leg and onto the floor, or you may wet a large area of the bed if it happens at night.  There may also be tricking that is uncontrolled.  If you are unsure, please call the office.      When should I call?  When contractions are strong and every 3-5 minutes.  If you have a gush of water or you think you might be leaking fluid.  If you are bleeding heavily.  If your baby is not moving around at least every 1-2 hours.  If you are worried about something.  When you think you are ready to go to the hospital.    Who do I call?  Call the office at [phone].  If the office is closed, the answering service will send a message to the physician on call.  The on call physician will be available for emergency phone calls only.          Can I eat in labor?  It is good to eat a light meal at home before going to the hospital.  Eat foods like crackers, popsicles, soup and fruit.  Avoid foods that are difficult to digest like meat, a lot of dairy products and high fat foods.  DO NOT EAT if you know you are scheduled for a  ().      What will happen when I get to the hospital?  When you arrive at the hospital, you will be admitted and examined.   There are a few factors that will determine if you will be allowed to be up out of bed or if you would need to stay in bed.  The main factors are how well the baby is entering into the pelvis and if the bag of arrieta is in intact or ruptured.  An intravenous (IV) solution will, with exceptions, be started.  This is extremely important especially for the baby.   Your  will be allowed in the room during your  labor. During the delivery, the nurses will inform you of the hospital policy and how many coaches are allowed.  You may desire pain medication or anesthesia for pain.  You probably discussed some aspects of pain medication with us during your prenatal care.  The various options may also have been discussed in Prenatal Classes.  We utilize IV narcotics and epidural anesthesia when our patients request to have them.  If you chose to have no anesthesia, none will be administered.  A local anesthetic may be used at the time of delivery      What should I to bring to the hospital?  Maternity clothes to go home in  You can bring your own night gown to wear after giving birth, but most women prefer to wear the hospital gown because it may get soiled  Nursing Bra if you are planning to breastfeed  Clothes for your baby to go home in  Baby Car Seat.  Be sure you know how to install it correctly. Please install it before going to the hospital  Routine toiletries like toothbrush, shampoo, hairbrush and etc.   You can bring your favorite pillow, but please put a colored pillow case on it so it doesn’t get mixed up with hospital pillows    How long will I stay in the hospital?  The date you leave the hospital may vary depending on the speed of your recovery.  If you have a vaginal delivery, you will stay in the hospital 24-48 hours after your delivery as long as you aren’t having any complications.    If you have had a , you will stay in the hospital 48-72 hours as long as you aren’t having any complications.       Going Home Instructions  There are no set rules as to what you may do each day or week after you are home.  You will receive discharge instructions to help you each day.  Remember, early ambulation in the hospital is to prevent complications.  Do not let this lull you into a false sense of strength or ability to do certain physical acts which may tire you excessively.  Please call the office within a few days  after you are discharged from the hospital to schedule your post-partum visit, which is usually 4-6 weeks after delivery.    Any medications necessary will be discussed on an individual basis.  If you decide to breastfeed your baby, you should continue your prenatal vitamins.  If you do not breastfeed, simply finish the prenatal vitamins you have.      The staff at [facility] wish you a joyous and exciting birth.  If there is anything we can do to make this a better experience for you please do not hesitate to ask.

## 2025-06-18 NOTE — TELEPHONE ENCOUNTER
Patient called in to check status of forms. Informed patient Forms were never emailed or faxed over. Patient resent forms via email. Forms received. Informed patients forms to be expedited.      Per patient request FORMS TO BE UPLOAD VIA [employer]

## 2025-06-23 NOTE — TELEPHONE ENCOUNTER
Patient Called to follow up on forms in the forms department. Family Medical Leave Act signed and confirmed  forms will be uploaded to [employer].

## 2025-07-03 NOTE — PATIENT INSTRUCTIONS
KICK COUNT INSTRUCTIONS    After 28 weeks of pregnancy, we would like for you to monitor your baby’s movement daily by doing kick counts, an easy way for you to assess your baby’s well-being.    The two main ways to track fetal kicks are:  Count the number of kicks you feel in a one-hour period.  Measure the amount of time it takes for the fetus to kick 10 times.    How to count kicks:  - Choose a time when the baby is active, such as after a meal.  - Preferably, we recommend monitoring fetal movements around the same time each day.   - You can use a free shamir such as 'Count the Kicks' - https://countthekicks.org/   - Sit comfortably or lie on your side, and count the number of movements in an hour.   - The evening hours seem to be the most convenient time to do this test, although you can also do this test anytime you are concerned about the baby’s movement.    The American Congress of Obstetricians and Gynecologists (ACOG) recommends that you time how long it takes you to feel 10 kicks, flutters, swishes or rolls. Ideally, you want to feel 10 movements within two hours.    How to wake up baby:   Try not to be alarmed if the fetus hasn’t moved in several hours. It’s normal for fetuses to have periods of rest, sleep and activity -- just like you.  - Taking a walk or moving your body.  - Drinking juice or another sweet beverage.  - Eating a meal.  - Lying down on your left side (this maximizes blood flow).  - Playing loud music.    When to contact the office:   Call the office right away at [phone] if you notice any of the following:  - Your baby moves less than 10 times in two hours while you are doing kick counts.  - Your baby moves much less often than on the days before.  - You have not felt your baby move all day.

## 2025-07-03 NOTE — PROGRESS NOTES
The following individual(s) verbally consented to be recorded using ambient AI listening technology and understand that they can each withdraw their consent to this listening technology at any point by asking the clinician to turn off or pause the recording:    Name: Aditi Busby    HENRY  30 year old yo, , at GA 32w3d    History of Present Illness  Aditi Busby is a 30 year old female who presents for a routine prenatal visit in her third trimester.    She is in her third trimester and reports feeling well overall. She experiences regular fetal movements, particularly in the evenings and late mornings, which she describes as consistent. This pregnancy feels similar to her first in terms of fetal activity.    No cramping or bleeding is reported. She has not yet completed her third trimester blood work, which includes tests for HIV and syphilis. She received a tetanus booster in 2022 during her previous pregnancy.    She works from home and is active, often holding her first child, Miryam, with her right hand. She sleeps on her left side and is left-handed. She is preparing for the upcoming holiday weekend and plans to relax by the pool with family and friends.    Vitals:    25 1144   BP: 102/70   Pulse: 64   Weight: 184 lb (83.5 kg)   Height: 67\"       Doing well, +FM  Denies LOF/VB/uctx  Rh +, TDAP administered today, EPDS Depression Scale Total: 0 (2025 12:56 PM)   Reminded to complete 3T labs.     Assessment:     Aditi Busby is a 30 year old  at 32w3d who presents for routine OB visit. Overall doing well.     Problem List Items Addressed This Visit    None  Visit Diagnoses         Third trimester pregnancy (HCC)    -  Primary                Plan:     Assessment & Plan  Third Trimester Pregnancy  30-year-old female in her third trimester of pregnancy. Reports regular fetal movements, indicating fetal activity and health. No cramping or bleeding. Fetal heart rate is in the 130s,  within normal range. She is feeling well overall. Discussed the importance of completing third trimester blood work, including HIV and syphilis screening, as it is the last set of blood work before admission to labor and delivery.  - Order third trimester blood work including HIV and syphilis screening  - Schedule next prenatal visit in two weeks    TDAP Vaccination  Previously received TDAP vaccination in 2022 during her last pregnancy. Discussed the importance of receiving the TDAP vaccine during each pregnancy to provide passive immunity to the  against pertussis, which can be fatal to infants and adults. Explained that the vaccine is typically administered around 27 to 28 weeks of gestation to ensure antibody transmission before delivery. Discussed common side effects such as arm soreness and rare occurrence of fever. She is considering timing for the vaccination based on her schedule and activities, including pool activities and work from home.  - Administer TDAP vaccine at next visit if she consents      Patient Active Problem List    Diagnosis    History of episiotomy     Aditi reports she was not consented and was unaware it even occurred. She would understandably like it avoided in future labors.      Tetanus, diphtheria, and acellular pertussis (Tdap) vaccination declined    Supervision of other normal pregnancy, antepartum (HCC)    Dysplasia of cervix, low grade (HUSEYIN 1)     2022.  Plan repeat Pap/HPV one year  2024 ASCUS + HPV  2025 NILM HPV neg       Irritable bowel syndrome with both constipation and diarrhea    Generalized anxiety disorder       - continue routine prenatal care   - labor and rupture of membrane precautions provided  - Fetal movement instructions given    Return to clinic in 2 weeks for HENRY visit       Parth Mccain MD   [facility] - O[facility]      Note to patient and family   The  [employer] Cures Act makes medical notes available to patients in the interest of transparency.   However, please be advised that this is a medical document.  It is intended as [id number] communication.  It is written and medical language may contain abbreviations or verbiage that are technical and unfamiliar.  It may appear blunt or direct.  Medical documents are intended to carry relevant information, facts as evident, and the clinical opinion of the practitioner.

## 2025-07-16 NOTE — PATIENT INSTRUCTIONS
KICK COUNT INSTRUCTIONS    After 28 weeks of pregnancy, we would like for you to monitor your baby’s movement daily by doing kick counts, an easy way for you to assess your baby’s well-being.    The two main ways to track fetal kicks are:  Count the number of kicks you feel in a one-hour period.  Measure the amount of time it takes for the fetus to kick 10 times.    How to count kicks:  - Choose a time when the baby is active, such as after a meal.  - Preferably, we recommend monitoring fetal movements around the same time each day.   - You can use a free shamir such as 'Count the Kicks' - https://countthekicks.org/   - Sit comfortably or lie on your side, and count the number of movements in an hour.   - The evening hours seem to be the most convenient time to do this test, although you can also do this test anytime you are concerned about the baby’s movement.    The American Congress of Obstetricians and Gynecologists (ACOG) recommends that you time how long it takes you to feel 10 kicks, flutters, swishes or rolls. Ideally, you want to feel 10 movements within two hours.    How to wake up baby:   Try not to be alarmed if the fetus hasn’t moved in several hours. It’s normal for fetuses to have periods of rest, sleep and activity -- just like you.  - Taking a walk or moving your body.  - Drinking juice or another sweet beverage.  - Eating a meal.  - Lying down on your left side (this maximizes blood flow).  - Playing loud music.    When to contact the office:   Call the office right away at [phone] if you notice any of the following:  - Your baby moves less than 10 times in two hours while you are doing kick counts.  - Your baby moves much less often than on the days before.  - You have not felt your baby move all day.

## 2025-07-16 NOTE — PROGRESS NOTES
The following individual(s) verbally consented to be recorded using ambient AI listening technology and understand that they can each withdraw their consent to this listening technology at any point by asking the clinician to turn off or pause the recording:    Name: Aditi Busby    HENRY  30 year old yo, , at GA 34w2d    History of Present Illness  Aditi Busby is a 30 year old female who presents for a routine prenatal follow-up at 36 weeks of gestation.    She is currently 36 weeks pregnant and feels good overall with noticeable fetal movements. She reports that she does not think she is having cramps or contractions, but occasionally feels unsure if her stomach discomfort is due to fetal movements or other causes.    She experiences indigestion, which varies in intensity, being present all day on some days and only at night on others. This discomfort affects her ability to get comfortable, often requiring her to sit up, which leads to her foot falling asleep.    She recalls her previous pregnancy with her daughter Miryam, who was born at 38 weeks weighing 8 pounds 9 ounces. She expresses some concern about the timing of her current delivery, wondering if it will be similar to her previous experience.    She discusses a past experience during her delivery with Miryam, where an episiotomy was performed due to fetal distress. She was unaware of this until reading her birth notes months later, which caused her some distress.    She mentions a billing issue related to a follow-up ultrasound to check on her current baby's hand, which she plans to address with the billing department.    Vitals:    25 1644   BP: 120/78   Pulse: 80   Weight: 187 lb 6 oz (85 kg)   Height: 67\"       Doing well, +FM  Denies LOF/VB/uctx  Rh +, TDAP received, EPDS Depression Scale Total: 0 (2025 12:56 PM)   3T labs completed     Assessment:     Aditi Busby is a 30 year old  at 34w2d who presents for routine OB  visit. Overall doing well.     Problem List Items Addressed This Visit    None  Visit Diagnoses         Third trimester pregnancy (HCC)    -  Primary                Plan:     Assessment & Plan  Third trimester pregnancy  At 36 weeks gestation, she experiences occasional cramps, uncertain if related to fetal movement or gastrointestinal discomfort. Anticipated natural labor aligns with her previous pregnancy, with a maximum gestation of 40 weeks and 6 days. Elective induction between 39 and 40 weeks is an option, though currently limited by hospital policy. She is reassured about labor care, emphasizing non-routine episiotomy use, with her prior experience due to urgent delivery needs.  - Discuss elective induction options between 39 and 40 weeks if desired.  - Reassure regarding labor and delivery process, emphasizing non-routine episiotomy use.    Episiotomy  It is clarified that episiotomies are not routinely performed and the prior procedure was necessary due to fetal distress. Natural tearing is safer if it occurs.  - Reassure that episiotomies are not routinely performed and discuss the safety of natural tearing.    General Health Maintenance  She is advised to address billing issues related to her ultrasound and contact billing to resolve discrepancies.  - Contact billing to resolve ultrasound charge discrepancies.      Patient Active Problem List    Diagnosis    History of episiotomy     Aditi reports she was not consented and was unaware it even occurred. She would understandably like it avoided in future labors.      Tetanus, diphtheria, and acellular pertussis (Tdap) vaccination declined    Supervision of other normal pregnancy, antepartum (HCC)    Dysplasia of cervix, low grade (HUSEYIN 1)     12/2022.  Plan repeat Pap/HPV one year  1/2024 ASCUS + HPV  2/2025 NILM HPV neg       Irritable bowel syndrome with both constipation and diarrhea    Generalized anxiety disorder       - continue routine prenatal care    - labor and rupture of membrane precautions provided  - Fetal movement instructions given    Return to clinic in 2 weeks for HENRY visit       Parth Mccain MD   [facility] - OBGYN      Note to patient and family   The 21st Century Cures Act makes medical notes available to patients in the interest of transparency.  However, please be advised that this is a medical document.  It is intended as vjjm-yo-[id number] communication.  It is written and medical language may contain abbreviations or verbiage that are technical and unfamiliar.  It may appear blunt or direct.  Medical documents are intended to carry relevant information, facts as evident, and the clinical opinion of the practitioner.